# Patient Record
Sex: FEMALE | Race: OTHER | Employment: FULL TIME | URBAN - METROPOLITAN AREA
[De-identification: names, ages, dates, MRNs, and addresses within clinical notes are randomized per-mention and may not be internally consistent; named-entity substitution may affect disease eponyms.]

---

## 2020-12-29 ENCOUNTER — OFFICE VISIT (OUTPATIENT)
Dept: ENDOCRINOLOGY | Age: 25
End: 2020-12-29
Payer: COMMERCIAL

## 2020-12-29 VITALS
RESPIRATION RATE: 14 BRPM | HEIGHT: 63 IN | SYSTOLIC BLOOD PRESSURE: 120 MMHG | DIASTOLIC BLOOD PRESSURE: 82 MMHG | TEMPERATURE: 97.2 F | HEART RATE: 87 BPM | WEIGHT: 170 LBS | BODY MASS INDEX: 30.12 KG/M2

## 2020-12-29 PROCEDURE — 99204 OFFICE O/P NEW MOD 45 MIN: CPT | Performed by: INTERNAL MEDICINE

## 2020-12-29 RX ORDER — LEVOTHYROXINE SODIUM 0.12 MG/1
125 TABLET ORAL DAILY
COMMUNITY
Start: 2020-05-14 | End: 2021-01-07 | Stop reason: SDUPTHER

## 2020-12-29 RX ORDER — NORETHINDRONE ACETATE AND ETHINYL ESTRADIOL 1MG-20(21)
1 KIT ORAL
COMMUNITY
Start: 2020-01-22 | End: 2021-03-23 | Stop reason: SDUPTHER

## 2020-12-29 NOTE — PROGRESS NOTES
SUBJECTIVE:  Pavan Woo is a 30 y.o. female who is being evaluated for hypothyroidism. 1. Acquired hypothyroidism    This started in 2019. Patient was diagnosed with hypothyroidism. The problem has been gradually worsening. Previous thyroid studies include: TSH and free thyroxine. Patient started medication in 2019. Currently patient is on: levothyroxine. Misses 50% doses a month. Current complaints: fatigue, irritability, mood swings, hair loss  Has fatigue, moderate in intensity  Fatigue worse in the afternoon  Has sleepiness    2. Hashimoto's thyroiditis  Complains of fatigue, weight gain  Positive TPO antibodies    3. Oligomenorrhea, unspecified type  Irregular periods without BCP and with BCP, usually skips 1 month  Started periods at 13 yo. Periods used to be heavy, not as heavy at this time. 4. Class 1 obesity with body mass index (BMI) of 30.0 to 30.9 in adult, unspecified obesity type, unspecified whether serious comorbidity present  Gained weight 20 pounds over 2 years despite diet and exercise  Has   Celiac panel negative  On [facility] weight management program, lost some weight    5. Goiter  History of obstructive symptoms: difficulty swallowing No, changes in voice/hoarseness No.  History of radiation to patient's neck: No  Resent iodine exposure: No  Family history includes hypothyroidism  Family history of thyroid cancer: No    6. Low libido  Patient complains of low libido and is concerned that its abnormal at her age    9. Anemia, unspecified type  Has fatigue    8. B12 deficiency  No numbness    History reviewed. No pertinent past medical history.   Patient Active Problem List    Diagnosis Date Noted    Hashimoto's thyroiditis 12/29/2020    Acquired hypothyroidism 12/29/2020    Anemia 12/29/2020    B12 deficiency 12/29/2020    Goiter 12/29/2020    Class 1 obesity with body mass index (BMI) of 30.0 to 30.9 in adult 12/29/2020    Oligomenorrhea 12/29/2020  Low libido 12/29/2020     History reviewed. No pertinent surgical history. Family History   Problem Relation Age of Onset    Hypothyroidism Mother     Bipolar Disorder Mother      Social History     Socioeconomic History    Marital status: Single     Spouse name: None    Number of children: None    Years of education: None    Highest education level: None   Occupational History    None   Social Needs    Financial resource strain: None    Food insecurity     Worry: None     Inability: None    Transportation needs     Medical: None     Non-medical: None   Tobacco Use    Smoking status: Never Smoker    Smokeless tobacco: Never Used   Substance and Sexual Activity    Alcohol use: Yes     Comment: 2-3 per week    Drug use: Never    Sexual activity: None   Lifestyle    Physical activity     Days per week: None     Minutes per session: None    Stress: None   Relationships    Social connections     Talks on phone: None     Gets together: None     Attends [employer] service: None     Active member of club or organization: None     Attends meetings of clubs or organizations: None     Relationship status: None    Intimate partner violence     Fear of current or ex partner: None     Emotionally abused: None     Physically abused: None     Forced sexual activity: None   Other Topics Concern    None   Social History Narrative    None     Current Outpatient Medications   Medication Sig Dispense Refill    levothyroxine (SYNTHROID) 125 MCG tablet Take 125 mcg by mouth daily      norethindrone-ethinyl estradiol (JUNEL FE 1/20) 1-20 MG-MCG per tablet Take 1 tablet by mouth       No current facility-administered medications for this visit.       No Known Allergies  Family Status   Relation Name Status    Mother  (Not Specified)       Review of Systems:  Constitutional: has fatigue, no fever, has recent weight gain, no recent weight loss, no changes in appetite Eyes: no eye pain, no change in vision, no eye redness, no eye irritation, no double vision  Ears, nose, throat: no nasal congestion, no sore throat, no earache, no decrease in hearing, no hoarseness, no dry mouth, no sinus problems, no difficulty swallowing, no neck lumps, no dental problems, no mouth sores, no ringing in ears  Pulmonary: no shortness of breath, no wheezing, no dyspnea on exertion, no cough  Cardiovascular: no chest pain, no lower extremity edema, no orthopnea, no intermittent leg claudication, no palpitations  Gastrointestinal: no abdominal pain, no nausea, no vomiting, no diarrhea, no constipation, no dysphagia, no heartburn, no bloating  Genitourinary: no dysuria, no urinary incontinence, no urinary hesitancy, no urinary frequency, no feelings of urinary urgency, no nocturia  Musculoskeletal: no joint swelling, no joint stiffness, no joint pain, no muscle cramps, no muscle pain, no bone pain  Integument/Breast: has hair loss, no skin rashes, no skin lesions, no itching, no dry skin  Neurological: no numbness, no tingling, no weakness, no confusion, has headaches, no dizziness, no fainting, no tremors, no decrease in memory, no balance problems  Psychiatric: has anxiety, has depression, no insomnia  Hematologic/Lymphatic: no tendency for easy bleeding, no swollen lymph nodes, no tendency for easy bruising  Immunology: no seasonal allergies, no frequent infections, no frequent illnesses  Endocrine: has temperature intolerance    /82   Pulse 87   Temp 97.2 °F (36.2 °C)   Resp 14   Ht 5' 3\" (1.6 m)   Wt 170 lb (77.1 kg)   LMP 12/27/2020   BMI 30.11 kg/m²    Wt Readings from Last 3 Encounters:   12/29/20 170 lb (77.1 kg)     Body mass index is 30.11 kg/m².     OBJECTIVE:  Constitutional: no acute distress, well appearing and well nourished  Psychiatric: oriented to person, place and time, judgement and insight and normal, recent and remote memory and intact and mood and affect are normal Skin: skin and subcutaneous tissue is normal without mass, normal turgor  Head and Face: examination of head and face revealed no abnormalities  Eyes: no lid or conjunctival swelling, erythema or discharge, pupils are normal, equal, round, reactive to light  Ears/Nose: external inspection of ears and nose revealed no abnormalities, hearing is grossly normal  Oropharynx/Mouth/Face: lips, tongue and gums are normal with no lesions, the voice quality was normal  Neck: neck is supple and symmetric, with midline trachea and no masses, thyroid is enlarged  Lymphatics: normal cervical lymph nodes, normal supraclavicular nodes  Pulmonary: no increased work of breathing or signs of respiratory distress, lungs are clear to auscultation  Cardiovascular: normal heart rate and rhythm, normal S1 and S2, no murmurs and pedal pulses and 2+ bilaterally, No edema  Abdomen: abdomen is soft, non-tender with no masses  Musculoskeletal: normal gait and station and exam of the digits and nails are normal  Neurological: normal coordination and normal general cortical function      Lab Review:    Lab Results   Component Value Date    WBC 4.7 12/29/2020    HGB 12.9 12/29/2020    HCT 39.5 12/29/2020    MCV 82.9 12/29/2020     12/29/2020     Lab Results   Component Value Date     12/29/2020    K 4.1 12/29/2020     12/29/2020    CO2 26 12/29/2020    BUN 13 12/29/2020    CREATININE 0.7 12/29/2020    GLUCOSE 87 12/29/2020    CALCIUM 9.2 12/29/2020    PROT 7.9 12/29/2020    LABALBU 4.5 12/29/2020    BILITOT 0.3 12/29/2020    ALKPHOS 29 12/29/2020    AST 28 12/29/2020    ALT 18 12/29/2020    LABGLOM >60 12/29/2020    GFRAA >60 12/29/2020    AGRATIO 1.3 12/29/2020    GLOB 3.4 12/29/2020     Lab Results   Component Value Date    TSH 34.14 12/29/2020    FT3 2.5 12/29/2020     No results found for: LABA1C  No results found for: EAG  No results found for: CHOL  No results found for: TRIG  No results found for: HDL Reviewed and/or ordered radiology tests Yes   Reviewed and/or ordered other diagnostic tests No  Discussed test results with performing physician No  Independently reviewed image, tracing, or specimen No  Made a decision to obtain old records No  Reviewed and summarized old records No   TSH 26.7-8.2-2.8  Hemoglobin A1c 5.4  B12 96  TPO antibody 734.9  Thyroglobulin antibody 1.9  Celiac panel negative  Hb 11.2  Obtained history from other than patient No    Berenice Aga was counseled regarding symptoms of thyroid, oligomenorrhea, Hashimoto's thyroiditis, low libido diagnosis, course and complications of disease if inadequately treated, side effects of medications, diagnosis, treatment options, and prognosis, risks, benefits, complications, and alternatives of treatment, labs, imaging and other studies and treatment targets and goals, different thyroid medications, side effects and benefits. She understands instructions and counseling. Total visit time 45 minutes, more than 50% was counseling time  See assessment, plan and counseling note for details    Return in about 2 weeks (around 1/12/2021) for thyroid problems.     Electronically signed by Ann Pratt MD on 12/31/2020 at 8:40 PM

## 2021-01-07 ENCOUNTER — OFFICE VISIT (OUTPATIENT)
Dept: ENDOCRINOLOGY | Age: 26
End: 2021-01-07
Payer: COMMERCIAL

## 2021-01-07 VITALS
HEIGHT: 63 IN | BODY MASS INDEX: 29.95 KG/M2 | RESPIRATION RATE: 14 BRPM | OXYGEN SATURATION: 98 % | SYSTOLIC BLOOD PRESSURE: 110 MMHG | WEIGHT: 169 LBS | HEART RATE: 62 BPM | DIASTOLIC BLOOD PRESSURE: 80 MMHG

## 2021-01-07 DIAGNOSIS — E04.9 GOITER: ICD-10-CM

## 2021-01-07 DIAGNOSIS — E03.9 ACQUIRED HYPOTHYROIDISM: Primary | ICD-10-CM

## 2021-01-07 DIAGNOSIS — E53.8 B12 DEFICIENCY: ICD-10-CM

## 2021-01-07 DIAGNOSIS — E06.3 HASHIMOTO'S THYROIDITIS: ICD-10-CM

## 2021-01-07 DIAGNOSIS — E55.9 VITAMIN D DEFICIENCY: ICD-10-CM

## 2021-01-07 DIAGNOSIS — E66.9 CLASS 1 OBESITY WITH BODY MASS INDEX (BMI) OF 30.0 TO 30.9 IN ADULT, UNSPECIFIED OBESITY TYPE, UNSPECIFIED WHETHER SERIOUS COMORBIDITY PRESENT: ICD-10-CM

## 2021-01-07 DIAGNOSIS — R68.82 LOW LIBIDO: ICD-10-CM

## 2021-01-07 DIAGNOSIS — N91.5 OLIGOMENORRHEA, UNSPECIFIED TYPE: ICD-10-CM

## 2021-01-07 DIAGNOSIS — D64.9 ANEMIA, UNSPECIFIED TYPE: ICD-10-CM

## 2021-01-07 PROCEDURE — G8427 DOCREV CUR MEDS BY ELIG CLIN: HCPCS | Performed by: INTERNAL MEDICINE

## 2021-01-07 PROCEDURE — G8417 CALC BMI ABV UP PARAM F/U: HCPCS | Performed by: INTERNAL MEDICINE

## 2021-01-07 PROCEDURE — G8484 FLU IMMUNIZE NO ADMIN: HCPCS | Performed by: INTERNAL MEDICINE

## 2021-01-07 PROCEDURE — 1036F TOBACCO NON-USER: CPT | Performed by: INTERNAL MEDICINE

## 2021-01-07 PROCEDURE — 99213 OFFICE O/P EST LOW 20 MIN: CPT | Performed by: INTERNAL MEDICINE

## 2021-01-07 RX ORDER — LEVOTHYROXINE SODIUM 0.12 MG/1
125 TABLET ORAL DAILY
Qty: 30 TABLET | Refills: 2 | Status: SHIPPED | OUTPATIENT
Start: 2021-01-07 | End: 2021-02-23

## 2021-01-07 RX ORDER — MELATONIN
2000 DAILY
Qty: 90 TABLET | Refills: 1
Start: 2021-01-07

## 2021-01-07 NOTE — PROGRESS NOTES
SUBJECTIVE:  Sandra Scott is a 30 y.o. female who is being evaluated for hypothyroidism. 1. Acquired hypothyroidism    This started in 2019. Patient was diagnosed with hypothyroidism. The problem has been gradually worsening. Previous thyroid studies include: TSH and free thyroxine. Patient started medication in 2019. Currently patient is on: levothyroxine. Misses 50% doses a month. Current complaints: fatigue, irritability, mood swings, hair loss  Has fatigue, moderate in intensity  Fatigue worse in the afternoon  Has sleepiness    2. Hashimoto's thyroiditis  Complains of fatigue, weight gain  Positive TPO antibodies    3. Oligomenorrhea, unspecified type  Irregular periods without BCP and with BCP, usually skips 1 month  Started periods at 13 yo. Periods used to be heavy, not as heavy at this time. 4. Obesity  Gained weight 20 pounds over 2 years despite diet and exercise  Has   Celiac panel negative  On [employer] [facility] program, lost some weight    5. Goiter  History of obstructive symptoms: difficulty swallowing No, changes in voice/hoarseness No.  History of radiation to patient's neck: No  Resent iodine exposure: No  Family history includes hypothyroidism  Family history of thyroid cancer: No    6. Low libido  Patient complains of low libido and is concerned that its abnormal at her age    9. Anemia, unspecified type  Has fatigue    8. B12 deficiency  No numbness    9. Vitamin D deficiency   Has fatigue    History reviewed. No pertinent past medical history. Patient Active Problem List    Diagnosis Date Noted    Hashimoto's thyroiditis 12/29/2020    Acquired hypothyroidism 12/29/2020    Anemia 12/29/2020    B12 deficiency 12/29/2020    Goiter 12/29/2020    Class 1 obesity with body mass index (BMI) of 30.0 to 30.9 in adult 12/29/2020    Oligomenorrhea 12/29/2020    Low libido 12/29/2020     History reviewed. No pertinent surgical history.   Family History Problem Relation Age of Onset    Hypothyroidism Mother     Bipolar Disorder Mother      Social History     Socioeconomic History    Marital status: Single     Spouse name: None    Number of children: None    Years of education: None    Highest education level: None   Occupational History    None   Social Needs    Financial resource strain: None    Food insecurity     Worry: None     Inability: None    Transportation needs     Medical: None     Non-medical: None   Tobacco Use    Smoking status: Never Smoker    Smokeless tobacco: Never Used   Substance and Sexual Activity    Alcohol use: Yes     Comment: 2-3 per week    Drug use: Never    Sexual activity: None   Lifestyle    Physical activity     Days per week: None     Minutes per session: None    Stress: None   Relationships    Social connections     Talks on phone: None     Gets together: None     Attends [employer] service: None     Active member of club or organization: None     Attends meetings of clubs or organizations: None     Relationship status: None    Intimate partner violence     Fear of current or ex partner: None     Emotionally abused: None     Physically abused: None     Forced sexual activity: None   Other Topics Concern    None   Social History Narrative    None     Current Outpatient Medications   Medication Sig Dispense Refill    levothyroxine (SYNTHROID) 125 MCG tablet Take 1 tablet by mouth daily 30 tablet 2    norethindrone-ethinyl estradiol (JUNEL FE 1/20) 1-20 MG-MCG per tablet Take 1 tablet by mouth       No current facility-administered medications for this visit.       No Known Allergies  Family Status   Relation Name Status    Mother  (Not Specified)       Review of Systems:  Constitutional: has fatigue, no fever, has recent weight gain, no recent weight loss, no changes in appetite  Eyes: no eye pain, no change in vision, no eye redness, no eye irritation, no double vision Ears, nose, throat: no nasal congestion, no sore throat, no earache, no decrease in hearing, no hoarseness, no dry mouth, no sinus problems, no difficulty swallowing, no neck lumps, no dental problems, no mouth sores, no ringing in ears  Pulmonary: no shortness of breath, no wheezing, no dyspnea on exertion, no cough  Cardiovascular: no chest pain, no lower extremity edema, no orthopnea, no intermittent leg claudication, no palpitations  Gastrointestinal: no abdominal pain, no nausea, no vomiting, no diarrhea, no constipation, no dysphagia, no heartburn, no bloating  Genitourinary: no dysuria, no urinary incontinence, no urinary hesitancy, no urinary frequency, no feelings of urinary urgency, no nocturia  Musculoskeletal: no joint swelling, no joint stiffness, no joint pain, no muscle cramps, no muscle pain, no bone pain  Integument/Breast: has hair loss, no skin rashes, no skin lesions, no itching, no dry skin  Neurological: no numbness, no tingling, no weakness, no confusion, has headaches, no dizziness, no fainting, no tremors, no decrease in memory, no balance problems  Psychiatric: has anxiety, has depression, no insomnia  Hematologic/Lymphatic: no tendency for easy bleeding, no swollen lymph nodes, no tendency for easy bruising  Immunology: no seasonal allergies, no frequent infections, no frequent illnesses  Endocrine: has temperature intolerance    /80   Pulse 62   Resp 14   Ht 5' 3\" (1.6 m)   Wt 169 lb (76.7 kg)   LMP 12/27/2020   SpO2 98%   BMI 29.94 kg/m²    Wt Readings from Last 3 Encounters:   01/07/21 169 lb (76.7 kg)   12/29/20 170 lb (77.1 kg)     Body mass index is 29.94 kg/m².     OBJECTIVE:  Constitutional: no acute distress, well appearing and well nourished  Psychiatric: oriented to person, place and time, judgement and insight and normal, recent and remote memory and intact and mood and affect are normal  Skin: skin and subcutaneous tissue is normal without mass, normal turgor Head and Face: examination of head and face revealed no abnormalities  Eyes: no lid or conjunctival swelling, erythema or discharge, pupils are normal, equal, round, reactive to light  Ears/Nose: external inspection of ears and nose revealed no abnormalities, hearing is grossly normal  Oropharynx/Mouth/Face: lips, tongue and gums are normal with no lesions, the voice quality was normal  Neck: neck is supple and symmetric, with midline trachea and no masses, thyroid is enlarged  Lymphatics: normal cervical lymph nodes, normal supraclavicular nodes  Pulmonary: no increased work of breathing or signs of respiratory distress, lungs are clear to auscultation  Cardiovascular: normal heart rate and rhythm, normal S1 and S2, no murmurs and pedal pulses and 2+ bilaterally, No edema  Abdomen: abdomen is soft, non-tender with no masses  Musculoskeletal: normal gait and station and exam of the digits and nails are normal  Neurological: normal coordination and normal general cortical function      Lab Review:    Lab Results   Component Value Date    WBC 4.7 12/29/2020    HGB 12.9 12/29/2020    HCT 39.5 12/29/2020    MCV 82.9 12/29/2020     12/29/2020     Lab Results   Component Value Date     12/29/2020    K 4.1 12/29/2020     12/29/2020    CO2 26 12/29/2020    BUN 13 12/29/2020    CREATININE 0.7 12/29/2020    GLUCOSE 87 12/29/2020    CALCIUM 9.2 12/29/2020    PROT 7.9 12/29/2020    LABALBU 4.5 12/29/2020    BILITOT 0.3 12/29/2020    ALKPHOS 29 12/29/2020    AST 28 12/29/2020    ALT 18 12/29/2020    LABGLOM >60 12/29/2020    GFRAA >60 12/29/2020    AGRATIO 1.3 12/29/2020    GLOB 3.4 12/29/2020     Lab Results   Component Value Date    TSH 34.14 12/29/2020    FT3 2.5 12/29/2020     No results found for: LABA1C  No results found for: EAG  No results found for: CHOL  No results found for: TRIG  No results found for: HDL  No results found for: LDLCHOLESTEROL, LDLCALC  No results found for: LABVLDL, VLDL No results found for: JOSIAH  No results found for: Dominick Levi  Lab Results   Component Value Date    VITD25 24.4 12/29/2020        ASSESSMENT/PLAN:    1. Acquired hypothyroidism  Patient was taking only half tablet of levothyroxine for a while, and recently a week ago resumed full tablet. TSH uncontrolled, 34.14  Continue current levothyroxine 0.25 mg daily  Counseled parents about different thyroid medications, their side effects and benefits. Discussed several treatment options when thyroid is controlled  - T3, Free; Future  - T4, Free; Future  - TSH without Reflex; Future  - T3, Reverse; Future    2. Hashimoto's thyroiditis  - Thyroid Peroxidase Antibody; Future  - Anti-Thyroglobulin Antibody; Future    3. Anemia, unspecified type  Low Ferritin 9.8  - Vitamin B12 & Folate; Future  - CBC; Future  - Iron and TIBC; Future  - Ferritin; Future  - Vitamin D 25 Hydroxy; Future    4. B12 deficiency  B12 173  Will see [facility]  - Vitamin B12 & Folate; Future    5. Goiter  - Thyroid Peroxidase Antibody; Future  - Anti-Thyroglobulin Antibody; Future  - US HEAD NECK SOFT TISSUE THYROID; Future    6. Obesity  - Comprehensive Metabolic Panel; Future  Patient is interested in weight loss medications, but advised to control thyroid first  Diet and exercise    7. Oligomenorrhea, unspecified type  Follow    8. Low libido  - DHEA-Sulfate; Future  - Testosterone, free, total; Future    9.  Vitamin D deficiency  25OHvitamin D 24.4  Has fatigue  Start vitamin D 2000 international units daily  New problem    Reviewed and/or ordered clinical lab results Yes  Reviewed and/or ordered radiology tests Yes   Reviewed and/or ordered other diagnostic tests No  Discussed test results with performing physician No  Independently reviewed image, tracing, or specimen No  Made a decision to obtain old records No  Reviewed and summarized old records No   TSH 26.7-8.2-2.8  Hemoglobin A1c 5.4  B12 96  TPO antibody 734.9 Thyroglobulin antibody 1.9  Celiac panel negative  Hb 11.2  Obtained history from other than patient Danielle Mckeon was counseled regarding symptoms of thyroid, oligomenorrhea, Hashimoto's thyroiditis, low libido diagnosis, course and complications of disease if inadequately treated, side effects of medications, diagnosis, treatment options, and prognosis, risks, benefits, complications, and alternatives of treatment, labs, imaging and other studies and treatment targets and goals, different thyroid medications, side effects and benefits. She understands instructions and counseling. Total time spent for this encounter 30 minutes    Return in about 1 month (around 2/7/2021) for thyroid problems.     Electronically signed by Suzan Cleveland MD on 1/7/2021 at 12:27 PM

## 2021-01-08 ENCOUNTER — HOSPITAL ENCOUNTER (OUTPATIENT)
Dept: ULTRASOUND IMAGING | Age: 26
Discharge: HOME | End: 2021-01-08
Payer: COMMERCIAL

## 2021-01-08 DIAGNOSIS — E04.9 GOITER: ICD-10-CM

## 2021-01-08 PROCEDURE — 76536 US EXAM OF HEAD AND NECK: CPT

## 2021-01-12 ENCOUNTER — OFFICE VISIT (OUTPATIENT)
Dept: PRIMARY CARE CLINIC | Age: 26
End: 2021-01-12
Payer: COMMERCIAL

## 2021-01-12 VITALS
BODY MASS INDEX: 30.23 KG/M2 | OXYGEN SATURATION: 100 % | TEMPERATURE: 96.3 F | SYSTOLIC BLOOD PRESSURE: 117 MMHG | HEIGHT: 63 IN | RESPIRATION RATE: 16 BRPM | HEART RATE: 59 BPM | DIASTOLIC BLOOD PRESSURE: 77 MMHG | WEIGHT: 170.6 LBS

## 2021-01-12 DIAGNOSIS — Z11.3 SCREEN FOR STD (SEXUALLY TRANSMITTED DISEASE): ICD-10-CM

## 2021-01-12 DIAGNOSIS — R45.4 IRRITABILITY: ICD-10-CM

## 2021-01-12 DIAGNOSIS — E06.3 HASHIMOTO'S DISEASE: ICD-10-CM

## 2021-01-12 DIAGNOSIS — E53.8 LOW SERUM VITAMIN B12: ICD-10-CM

## 2021-01-12 DIAGNOSIS — D50.9 IRON DEFICIENCY ANEMIA, UNSPECIFIED IRON DEFICIENCY ANEMIA TYPE: ICD-10-CM

## 2021-01-12 DIAGNOSIS — Z11.59 NEED FOR HEPATITIS C SCREENING TEST: ICD-10-CM

## 2021-01-12 DIAGNOSIS — E55.9 VITAMIN D DEFICIENCY: ICD-10-CM

## 2021-01-12 DIAGNOSIS — Z01.419 WELL WOMAN EXAM WITH ROUTINE GYNECOLOGICAL EXAM: Primary | ICD-10-CM

## 2021-01-12 PROCEDURE — 99385 PREV VISIT NEW AGE 18-39: CPT | Performed by: STUDENT IN AN ORGANIZED HEALTH CARE EDUCATION/TRAINING PROGRAM

## 2021-01-12 PROCEDURE — G8484 FLU IMMUNIZE NO ADMIN: HCPCS | Performed by: STUDENT IN AN ORGANIZED HEALTH CARE EDUCATION/TRAINING PROGRAM

## 2021-01-12 RX ORDER — FERROUS SULFATE 325(65) MG
325 TABLET ORAL
Qty: 90 TABLET | Refills: 0 | Status: SHIPPED | OUTPATIENT
Start: 2021-01-12 | End: 2022-07-18

## 2021-01-12 RX ORDER — POLYETHYLENE GLYCOL 3350 17 G/17G
17 POWDER, FOR SOLUTION ORAL DAILY
Qty: 1530 G | Refills: 1 | Status: SHIPPED | OUTPATIENT
Start: 2021-01-12 | End: 2021-02-11

## 2021-01-12 SDOH — ECONOMIC STABILITY: TRANSPORTATION INSECURITY
IN THE PAST 12 MONTHS, HAS THE LACK OF TRANSPORTATION KEPT YOU FROM MEDICAL APPOINTMENTS OR FROM GETTING MEDICATIONS?: NO

## 2021-01-12 SDOH — ECONOMIC STABILITY: FOOD INSECURITY: WITHIN THE PAST 12 MONTHS, YOU WORRIED THAT YOUR FOOD WOULD RUN OUT BEFORE YOU GOT MONEY TO BUY MORE.: NEVER TRUE

## 2021-01-12 SDOH — ECONOMIC STABILITY: TRANSPORTATION INSECURITY
IN THE PAST 12 MONTHS, HAS LACK OF TRANSPORTATION KEPT YOU FROM MEETINGS, WORK, OR FROM GETTING THINGS NEEDED FOR DAILY LIVING?: NO

## 2021-01-12 ASSESSMENT — ENCOUNTER SYMPTOMS
COUGH: 0
DIARRHEA: 0
CONSTIPATION: 0
SHORTNESS OF BREATH: 0
VOMITING: 0
SORE THROAT: 0
NAUSEA: 0
RHINORRHEA: 0

## 2021-01-12 ASSESSMENT — PATIENT HEALTH QUESTIONNAIRE - PHQ9
SUM OF ALL RESPONSES TO PHQ QUESTIONS 1-9: 2
SUM OF ALL RESPONSES TO PHQ QUESTIONS 1-9: 2
SUM OF ALL RESPONSES TO PHQ9 QUESTIONS 1 & 2: 2
1. LITTLE INTEREST OR PLEASURE IN DOING THINGS: 1
SUM OF ALL RESPONSES TO PHQ QUESTIONS 1-9: 2
2. FEELING DOWN, DEPRESSED OR HOPELESS: 1

## 2021-01-12 NOTE — PROGRESS NOTES
Behavioral Health Consultation  Walter Barbosa Psy.D. Psychologist  1/13/2021  Start time 2:35pm Stop time 3:05pm    Time spent with Patient: 30 minutes  This is patient's first [facility] appointment. Reason for Consult:  mood  Referring Provider: PCP    Feedback for PCP: No action needed. Writer will continue to follow pt. At initial visit, pt/guardian provided informed consent for the behavioral health program. Discussed with patient model of service to include the limits of confidentiality (i.e. abuse reporting, suicide intervention, etc.) and short-term intervention focused approach. Pt/guardian indicated understanding    TELEHEALTH VISIT -- Audio and video (During XGEBU-02 public health emergency)  }  Pursuant to the emergency declaration under the [facility]5 waiver authority and the [employer] General Act, this visit was conducted, with patient/guardian's consent, to reduce the patient's risk of exposure to COVID-19 and provide continuity of care for an established patient. Services were provided through a telehealth discussion to substitute for in-person clinic visit. Pt/guardian gave verbal informed consent to participate in telehealth services. Consent:  She and/or health care decision maker is aware that that she may receive a bill for this service, depending on her insurance coverage, and has provided verbal consent to proceed:  Yes Conducted a risk-benefit analysis and determined that the patient's presenting problems are consistent with the use of telepsychology. Determined that the patient has sufficient knowledge and skills in the use of technology enabling them to adequately benefit from telepsychology. It was determined that this patient was able to be properly treated without an in-person session. Patient/guardian verified that they were currently located at the [facility] address that was provided during registration. Verified the following information:  Patient's identification: Yes  Patient location: [address] [place] [id number]  Patient's call back number: [phone]   Patient's emergency contact's name and number, as well as permission to contact them if needed: Extended Emergency Contact Information  Primary Emergency Contact: [facility]  Address: [address] [place] [address] Phone: [phone]  Mobile Phone: [phone]  Relation: Spouse  Preferred language: English   needed? No     Provider location: [address] this is an episode with a patient who has not had a related appointment within my department in the past 7 days or scheduled within the next 24 hours. S:    Patient reports that she moved to [place] from [facility] and is now working from home. Patient reports feeling isolated. States that she has been feeling down more lately and crying more. Feels out of control of her emotions. Patient reports that she has attempted many self-care strategies. Has downloaded an mina that she is using to focus on her mental health. Patient is debating whether she wants to engage in mental health treatment.   Has found therapy to be helpful in the past. ? Depression sx: anhedonia/diminished interest in activities, depressed mood, psychomotor retardation, fatigue, feelings of worthlessness and difficulties with concentration, symptoms have been present most recently for the past 6 months, but worse over the past 2 months  ? SI/HI: Denied  ? Coping skills: self-care mina, exercise, talking with boyfriend, dog    History:    Psychiatric history:  ? Current psychotropic medications:  Denied  ? Past mental health treatment: saw therapists in the past    Social History:   ? Social: dogs, fiance (was supposed to get  in 4/2021), friends and family in [facility], family relationships are strained   ? Family psychiatric history: mother (bipolar disorder)  ? 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 Race/ethnicity: \"\"  ? Sabianist/spiritual beliefs: Denied    Health habits:  ? Sleep: average of 8 hours of sleep per night but not restful  ? Caffeine: 1 cup coffee per day  ? Exercise: , exercising   ? Medication adherence: Yes  Social History     Tobacco Use    Smoking status: Never Smoker    Smokeless tobacco: Never Used   Substance Use Topics    Alcohol use: Yes     Comment: 2-3 per week   ? Social History     Substance and Sexual Activity   Drug Use Never   ?     Current Outpatient Medications   Medication Sig Dispense Refill    ferrous sulfate (IRON 325) 325 (65 Fe) MG tablet Take 1 tablet by mouth daily (with breakfast) 90 tablet 0    cyanocobalamin ([employer] VITAMIN B12) 1000 MCG tablet Take 1 tablet by mouth daily 30 tablet 1    polyethylene glycol (GLYCOLAX) 17 GM/SCOOP powder Take 17 g by mouth daily 1530 g 1    levothyroxine (SYNTHROID) 125 MCG tablet Take 1 tablet by mouth daily 30 tablet 2    vitamin D3 (CHOLECALCIFEROL) 25 MCG (1000 UT) TABS tablet Take 2 tablets by mouth daily 90 tablet 1    norethindrone-ethinyl estradiol (JUNEL FE 1/20) 1-20 MG-MCG per tablet Take 1 tablet by mouth No current facility-administered medications for this visit. ? O:  MSE:    Appearance: good hygiene   Attitude: cooperative and friendly  Consciousness: alert  Orientation: oriented to person, place, time, general circumstance  Memory: recent and remote memory intact  Attention/Concentration: intact during session  Psychomotor Activity:normal  Eye Contact: normal  Speech: normal rate and volume, well-articulated  Mood: sad  Affect: dysphoric  Perception: within normal limits  Thought Content: within normal limits  Thought Process: logical, coherent and goal-directed  Insight: good  Judgment: intact  Ability to understand instructions: Yes  Ability to respond meaningfully: Yes  Morbid Ideation: no   Suicide Assessment: no suicidal ideation, plan, or intent  Homicidal Ideation: no    A:  Administered PHQ-9 (see below). PHQ Scores 1/12/2021   PHQ2 Score 2   PHQ9 Score 2     Interpretation of Total Score Depression Severity: 1-4 = Minimal depression, 5-9 = Mild depression, 10-14 = Moderate depression, 15-19 = Moderately severe depression, 20-27 = Severe depression    Assessment/Progress in treatment/Treatment plan:  Patient appears to be experiencing low mood, exacerbated by moving to [place], being isolated working from home, difficulties with weight loss, Hashimoto's disease and negative thoughts about herself. Current coping skills include exercise, talking with boyfriend, and dog and factors maintaining symptoms include maladaptive thoughts. May benefit from brief intervention from writer for adaptive coping skill development. Will refer to specialty mental health in the future if indicated. Diagnosis:    1.  Adjustment disorder with depressed mood       Patient Active Problem List   Diagnosis    Hashimoto's thyroiditis    Acquired hypothyroidism    Anemia    B12 deficiency    Goiter    Class 1 obesity with body mass index (BMI) of 30.0 to 30.9 in adult    Oligomenorrhea    Low libido  Vitamin D deficiency        Plan:  Set the following goals: 1) review ACT    Follow-up:   Return if symptoms worsen or fail to improve.      Pt interventions:  Established rapport, [place]-setting to identify pt's primary goals for [facility] visit / overall health, Supportive techniques, Provided Psychoeducation re: depression and depression treatment, Engaged in treatment planning and Discussed potential treatment options, including brief psychotherapy vs. referral for specialty mental health

## 2021-01-12 NOTE — PROGRESS NOTES
Prior to Visit Medications    Medication Sig Taking? Authorizing Provider   ferrous sulfate (IRON 325) 325 (65 Fe) MG tablet Take 1 tablet by mouth daily (with breakfast) Yes Viral Baeza MD   cyanocobalamin (CVS VITAMIN B12) 1000 MCG tablet Take 1 tablet by mouth daily Yes Viral Baeza MD   polyethylene glycol (GLYCOLAX) 17 GM/SCOOP powder Take 17 g by mouth daily Yes Viral Baeza MD   levothyroxine (SYNTHROID) 125 MCG tablet Take 1 tablet by mouth daily Yes Sunita Mcduffie MD   vitamin D3 (CHOLECALCIFEROL) 25 MCG (1000 UT) TABS tablet Take 2 tablets by mouth daily Yes Sunita Mcduffie MD   norethindrone-ethinyl estradiol (JUNEL FE 1/20) 1-20 MG-MCG per tablet Take 1 tablet by mouth Yes Historical Provider, MD        No Known Allergies    No past medical history on file. No past surgical history on file.     Social History     Socioeconomic History    Marital status: Single     Spouse name: Not on file    Number of children: Not on file    Years of education: Not on file    Highest education level: Not on file   Occupational History    Not on file   Social Needs    Financial resource strain: Not hard at all    Food insecurity     Worry: Never true     Inability: Never true   [employer] needs     Medical: No     Non-medical: No   Tobacco Use    Smoking status: Never Smoker    Smokeless tobacco: Never Used   Substance and Sexual Activity    Alcohol use: Yes     Comment: 2-3 per week    Drug use: Never    Sexual activity: Not on file   Lifestyle    Physical activity     Days per week: Not on file     Minutes per session: Not on file    Stress: Not on file   Relationships    Social connections     Talks on phone: Not on file     Gets together: Not on file     Attends Religion service: Not on file     Active member of club or organization: Not on file     Attends meetings of clubs or organizations: Not on file     Relationship status: Not on file    Intimate partner violence Fear of current or ex partner: Not on file     Emotionally abused: Not on file     Physically abused: Not on file     Forced sexual activity: Not on file   Other Topics Concern    Not on file   Social History Narrative    Not on file        Family History   Problem Relation Age of Onset    Hypothyroidism Mother     Bipolar Disorder Mother        ADVANCE DIRECTIVE: N, <no information>    Vitals:    01/12/21 1042   BP: 117/77   Site: Right Upper Arm   Position: Sitting   Cuff Size: Medium Adult   Pulse: 59   Resp: 16   Temp: 96.3 °F (35.7 °C)   TempSrc: Infrared   SpO2: 100%   Weight: 170 lb 9.6 oz (77.4 kg)   Height: 5' 3\" (1.6 m)     Estimated body mass index is 30.22 kg/m² as calculated from the following:    Height as of this encounter: 5' 3\" (1.6 m). Weight as of this encounter: 170 lb 9.6 oz (77.4 kg). Physical Exam  Vitals signs reviewed. Exam conducted with a chaperone present. Constitutional:       General: She is not in acute distress. Appearance: Normal appearance. She is not ill-appearing. HENT:      Head: Normocephalic and atraumatic. Right Ear: Tympanic membrane, ear canal and external ear normal.      Left Ear: Tympanic membrane, ear canal and external ear normal.      Nose: Nose normal. No rhinorrhea. Mouth/Throat:      Mouth: Mucous membranes are moist.      Pharynx: Oropharynx is clear. No oropharyngeal exudate. Eyes:      General: No scleral icterus. Right eye: No discharge. Left eye: No discharge. Extraocular Movements: Extraocular movements intact. Conjunctiva/sclera: Conjunctivae normal.   Neck:      Musculoskeletal: Neck supple. No muscular tenderness. Cardiovascular:      Rate and Rhythm: Normal rate and regular rhythm. Pulses: Normal pulses. Heart sounds: Normal heart sounds. No murmur. No gallop. Pulmonary:      Effort: Pulmonary effort is normal.      Breath sounds: Normal breath sounds. No wheezing, rhonchi or rales. Abdominal:      General: Abdomen is flat. Bowel sounds are normal. There is no distension. Palpations: Abdomen is soft. There is no mass. Genitourinary:     General: Normal vulva. Exam position: Knee-chest position. Vagina: Vaginal discharge present. Cervix: Normal.      Comments: Moderate amount of thick, white, clumpy discharge  Musculoskeletal: Normal range of motion. Lymphadenopathy:      Cervical: No cervical adenopathy. Skin:     General: Skin is warm. Capillary Refill: Capillary refill takes less than 2 seconds. Findings: No rash. Neurological:      General: No focal deficit present. Mental Status: She is alert. Cranial Nerves: No cranial nerve deficit. Psychiatric:         Mood and Affect: Mood normal.         Behavior: Behavior normal.         No flowsheet data found. Lab Results   Component Value Date    GLUCOSE 87 12/29/2020       The ASCVD Risk score (Kortney Small, et al., 2013) failed to calculate for the following reasons: The 2013 ASCVD risk score is only valid for ages 36 to 78      There is no immunization history on file for this patient.     Health Maintenance   Topic Date Due    Hepatitis C screen  1993    Varicella vaccine (1 of 2 - 2-dose childhood series) 09/03/1994    HIV screen  09/03/2008    DTaP/Tdap/Td vaccine (1 - Tdap) 09/03/2012    Cervical cancer screen  09/03/2014    Flu vaccine (1) 09/01/2020    TSH testing  12/29/2021    Hepatitis A vaccine  Aged Out    Hepatitis B vaccine  Aged Out    Hib vaccine  Aged Out    Meningococcal (ACWY) vaccine  Aged Out    Pneumococcal 0-64 years Vaccine  Aged Out       ASSESSMENT/PLAN: 40-year-old here to establish care for annual physical with Pap smear. She had a history of abnormal Pap smears in the past her last Pap smear last year was within normal limits per patient. On pelvic exam patient was moderately uncomfortable, did have a lot of discharge on exam possibly yeast, will send affirm. Patient also tearful after exam stating that she is going through [place] lot\". Denies any stressful events and feels safe at home states she is struggling with mood. She does have iron deficiency anemia as well as Hashimoto's that is uncontrolled so this likely does play a huge role in her mood. We will replete her iron and B12 and she will continue with endocrinology. Behavioral health referral given to her today although she may not make an appointment. She does states she already enrolled in online self-help therapy. Spent quite some time reassuring patient on treatment course and what to expect especially in the setting of anemia and hypothyroidism, I think once we replete her vitamins and get her thyroid under control, her mood will improve. Patient agreeable and seemed reassured. 1. Well woman exam with routine gynecological exam  -     PAP SMEAR  -     VAGINAL PATHOGENS PROBE *A  2. Irritability  -     Ambulatory referral to Psychology  3. Iron deficiency anemia, unspecified iron deficiency anemia type  -     ferrous sulfate (IRON 325) 325 (65 Fe) MG tablet; Take 1 tablet by mouth daily (with breakfast), Disp-90 tablet, R-0Normal  -     polyethylene glycol (GLYCOLAX) 17 GM/SCOOP powder; Take 17 g by mouth daily, Disp-1530 g, R-1Normal  4. Vitamin D deficiency  5. Hashimoto's disease  6. Low serum vitamin B12  -     cyanocobalamin (CVS VITAMIN B12) 1000 MCG tablet; Take 1 tablet by mouth daily, Disp-30 tablet, R-1Normal  7. Need for hepatitis C screening test  -     HEPATITIS C ANTIBODY; Future  8. Screen for STD (sexually transmitted disease)  -     HEPATITIS C ANTIBODY;  Future

## 2021-01-13 ENCOUNTER — PATIENT MESSAGE (OUTPATIENT)
Dept: ENDOCRINOLOGY | Age: 26
End: 2021-01-13

## 2021-01-13 ENCOUNTER — VIRTUAL VISIT (OUTPATIENT)
Dept: PSYCHOLOGY | Age: 26
End: 2021-01-13
Payer: COMMERCIAL

## 2021-01-13 DIAGNOSIS — F43.21 ADJUSTMENT DISORDER WITH DEPRESSED MOOD: Primary | ICD-10-CM

## 2021-01-13 DIAGNOSIS — E04.2 MULTINODULAR GOITER: Primary | ICD-10-CM

## 2021-01-13 LAB
C. TRACHOMATIS DNA ,URINE: NEGATIVE
CANDIDA SPECIES, DNA PROBE: NORMAL
GARDNERELLA VAGINALIS, DNA PROBE: NORMAL
N. GONORRHOEAE DNA, URINE: NEGATIVE
TRICHOMONAS VAGINALIS DNA: NORMAL

## 2021-01-13 PROCEDURE — 90791 PSYCH DIAGNOSTIC EVALUATION: CPT | Performed by: PSYCHOLOGIST

## 2021-01-13 NOTE — PATIENT INSTRUCTIONS
What is Acceptance and Commitment Therapy (ACT):    ? The aim of ACT is to create a rich, full and meaningful life, while accepting the pain that inevitably goes with it. ? This type of therapy suggests that when people are struggling in their lives, it is often because they are   1. Living too much in the future or past instead of the present   2. Trying to push away negative thoughts/feelings/memories even though this is not possible  3. Buying into unhelpful thoughts/feelings   4. Defining themselves by their thoughts/feelings/memories/life events   5. Do not know what is important to them or their actions are not in line with what is important to them (e.g., spending time with family is an important value to Dawson Mallory, but he spends 15 hours a day working and does not see his family)  ?  Goals of ACT  o Help people live with unwanted personal experiences (thoughts, feelings, life events, memories) that are out of our control instead of constantly fighting to change or ignore those experiences  o Clarify what is important to a person (their values), and help that person take action in line with their values so that they can live a meaningful life

## 2021-01-14 NOTE — TELEPHONE ENCOUNTER
I informed patient about results. Advised to do another blood work in 4 to 6 weeks for thyroid. Patient has 1.6 cm thyroid nodule, ordered FNA.

## 2021-01-14 NOTE — TELEPHONE ENCOUNTER
From: Richard Urias  To: Michael Howard MD  Sent: 1/13/2021 3:42 PM EST  Subject: Test Results Question    Hi Dr. Twan English,    I had blood work done yesterday (1/12) for Dr. Sravanthi Hatfield. [facility] technician also ran my thyroid blood tests that you ordered I have in 4 weeks. Do I still need to return for blood work in 4 weeks or do the results available now provide you the answers you need?     Thank you,  Magdalena Carrasco

## 2021-01-18 DIAGNOSIS — B37.31 VAGINAL YEAST INFECTION: Primary | ICD-10-CM

## 2021-01-18 RX ORDER — FLUCONAZOLE 150 MG/1
150 TABLET ORAL ONCE
Qty: 1 TABLET | Refills: 0 | Status: SHIPPED | OUTPATIENT
Start: 2021-01-18 | End: 2021-01-18

## 2021-02-23 ENCOUNTER — OFFICE VISIT (OUTPATIENT)
Dept: ENDOCRINOLOGY | Age: 26
End: 2021-02-23
Payer: COMMERCIAL

## 2021-02-23 VITALS
DIASTOLIC BLOOD PRESSURE: 60 MMHG | HEART RATE: 58 BPM | SYSTOLIC BLOOD PRESSURE: 102 MMHG | OXYGEN SATURATION: 100 % | RESPIRATION RATE: 14 BRPM | BODY MASS INDEX: 30.3 KG/M2 | WEIGHT: 171 LBS | HEIGHT: 63 IN

## 2021-02-23 DIAGNOSIS — D64.9 ANEMIA, UNSPECIFIED TYPE: ICD-10-CM

## 2021-02-23 DIAGNOSIS — E04.2 MULTIPLE THYROID NODULES: ICD-10-CM

## 2021-02-23 DIAGNOSIS — E55.9 VITAMIN D DEFICIENCY: ICD-10-CM

## 2021-02-23 DIAGNOSIS — R68.82 LOW LIBIDO: ICD-10-CM

## 2021-02-23 DIAGNOSIS — E66.9 CLASS 1 OBESITY WITH BODY MASS INDEX (BMI) OF 30.0 TO 30.9 IN ADULT, UNSPECIFIED OBESITY TYPE, UNSPECIFIED WHETHER SERIOUS COMORBIDITY PRESENT: ICD-10-CM

## 2021-02-23 DIAGNOSIS — E06.3 HASHIMOTO'S THYROIDITIS: ICD-10-CM

## 2021-02-23 DIAGNOSIS — E53.8 B12 DEFICIENCY: ICD-10-CM

## 2021-02-23 DIAGNOSIS — N91.5 OLIGOMENORRHEA, UNSPECIFIED TYPE: ICD-10-CM

## 2021-02-23 DIAGNOSIS — E03.9 ACQUIRED HYPOTHYROIDISM: Primary | ICD-10-CM

## 2021-02-23 PROCEDURE — G8427 DOCREV CUR MEDS BY ELIG CLIN: HCPCS | Performed by: INTERNAL MEDICINE

## 2021-02-23 PROCEDURE — 1036F TOBACCO NON-USER: CPT | Performed by: INTERNAL MEDICINE

## 2021-02-23 PROCEDURE — G8417 CALC BMI ABV UP PARAM F/U: HCPCS | Performed by: INTERNAL MEDICINE

## 2021-02-23 PROCEDURE — G8484 FLU IMMUNIZE NO ADMIN: HCPCS | Performed by: INTERNAL MEDICINE

## 2021-02-23 PROCEDURE — 99214 OFFICE O/P EST MOD 30 MIN: CPT | Performed by: INTERNAL MEDICINE

## 2021-02-23 RX ORDER — LEVOTHYROXINE SODIUM 0.12 MG/1
TABLET ORAL
Qty: 30 TABLET | Refills: 2
Start: 2021-02-23 | End: 2021-05-21 | Stop reason: SDUPTHER

## 2021-02-23 NOTE — PROGRESS NOTES
SUBJECTIVE:  Dyan Dao is a 30 y.o. female who is being evaluated for hypothyroidism. 1. Acquired hypothyroidism    This started in 2019. Patient was diagnosed with hypothyroidism. The problem has been gradually worsening. Previous thyroid studies include: TSH and free thyroxine. Patient started medication in 2019. Currently patient is on: levothyroxine. Misses 50% doses a month. Current complaints: fatigue, irritability, mood swings, hair loss  Has fatigue, moderate in intensity  Fatigue worse in the afternoon  Has sleepiness    2. Hashimoto's thyroiditis  Complains of fatigue, weight gain  Positive TPO antibodies    3. Oligomenorrhea, unspecified type  Irregular periods without BCP and with BCP, usually skips 1 month  Started periods at 13 yo. Periods used to be heavy, not as heavy at this time. 4. Obesity  Gained weight 20 pounds over 2 years despite diet and exercise  Has   Celiac panel negative  On [facility] weight management program, lost some weight    5. Multiple thyroid nodules  History of obstructive symptoms: difficulty swallowing No, changes in voice/hoarseness No.  History of radiation to patient's neck: No  Resent iodine exposure: No  Family history includes hypothyroidism  Family history of thyroid cancer: No    6. Low libido  Patient complains of low libido and is concerned that its abnormal at her age    9. Anemia, unspecified type  Has fatigue    8. B12 deficiency  No numbness    9.   Vitamin D deficiency   Has fatigue    THYROID ULTRASOUND   History : Goiter       COMMENTS :    Thyroid size : Right lobe 4.0 x 1.2 x 1.4 cm                                     Left lobe 3.6 x 1.2 x 1.4 cm   Echotexture : Heterogeneous   Nodules :    16 x 12 x 9 mm heterogeneously hypoechoic nodule in the right inferior lobe - ACR TR 3   7 x 4 x 6 mm hyperechoic nodule in the left inferior lobe - ACR TR 3                   Impression   IMPRESSION :       Six-month follow-up ultrasound to document stability of hypoechoic nodule in the right lobe.       Heterogeneous echotexture noted. This can be seen with thyroiditis. History reviewed. No pertinent past medical history. Patient Active Problem List    Diagnosis Date Noted    Vitamin D deficiency 01/07/2021    Hashimoto's thyroiditis 12/29/2020    Acquired hypothyroidism 12/29/2020    Anemia 12/29/2020    B12 deficiency 12/29/2020    Goiter 12/29/2020    Class 1 obesity with body mass index (BMI) of 30.0 to 30.9 in adult 12/29/2020    Oligomenorrhea 12/29/2020    Low libido 12/29/2020     History reviewed. No pertinent surgical history.   Family History   Problem Relation Age of Onset    Hypothyroidism Mother     Bipolar Disorder Mother      Social History     Socioeconomic History    Marital status: Single     Spouse name: None    Number of children: None    Years of education: None    Highest education level: None   Occupational History    None   Social Needs    Financial resource strain: Not hard at all   [employer] insecurity     Worry: Never true     Inability: Never true    Transportation needs     Medical: No     Non-medical: No   Tobacco Use    Smoking status: Never Smoker    Smokeless tobacco: Never Used   Substance and Sexual Activity    Alcohol use: Yes     Comment: 2-3 per week    Drug use: Never    Sexual activity: None   Lifestyle    Physical activity     Days per week: None     Minutes per session: None    Stress: None   Relationships    Social connections     Talks on phone: None     Gets together: None     Attends Jewish service: None     Active member of club or organization: None     Attends meetings of clubs or organizations: None     Relationship status: None    Intimate partner violence     Fear of current or ex partner: None     Emotionally abused: None     Physically abused: None     Forced sexual activity: None   Other Topics Concern    None   Social History Narrative    None     Current Outpatient Medications   Medication Sig Dispense Refill    levothyroxine (SYNTHROID) 125 MCG tablet 1 tablet 6 days a week, one and a half tablet 1 day a week 30 tablet 2    ferrous sulfate (IRON 325) 325 (65 Fe) MG tablet Take 1 tablet by mouth daily (with breakfast) 90 tablet 0    cyanocobalamin ([employer] VITAMIN B12) 1000 MCG tablet Take 1 tablet by mouth daily 30 tablet 1    vitamin D3 (CHOLECALCIFEROL) 25 MCG (1000 UT) TABS tablet Take 2 tablets by mouth daily 90 tablet 1    norethindrone-ethinyl estradiol (JUNEL FE 1/20) 1-20 MG-MCG per tablet Take 1 tablet by mouth       No current facility-administered medications for this visit.       No Known Allergies  Family Status   Relation Name Status    Mother  (Not Specified)       Review of Systems:  Constitutional: has fatigue, no fever, has recent weight gain, no recent weight loss, no changes in appetite  Eyes: no eye pain, no change in vision, no eye redness, no eye irritation, no double vision  Ears, nose, throat: no nasal congestion, no sore throat, no earache, no decrease in hearing, no hoarseness, no dry mouth, no sinus problems, no difficulty swallowing, no neck lumps, no dental problems, no mouth sores, no ringing in ears  Pulmonary: no shortness of breath, no wheezing, no dyspnea on exertion, no cough  Cardiovascular: no chest pain, no lower extremity edema, no orthopnea, no intermittent leg claudication, no palpitations  Gastrointestinal: no abdominal pain, no nausea, no vomiting, no diarrhea, no constipation, no dysphagia, no heartburn, no bloating  Genitourinary: no dysuria, no urinary incontinence, no urinary hesitancy, no urinary frequency, no feelings of urinary urgency, no nocturia  Musculoskeletal: no joint swelling, no joint stiffness, no joint pain, no muscle cramps, no muscle pain, no bone pain  Integument/Breast: has hair loss, no skin rashes, no skin lesions, no itching, no dry skin  Neurological: no numbness, no tingling, no weakness, no confusion, has headaches, no dizziness, no fainting, no tremors, no decrease in memory, no balance problems  Psychiatric: has anxiety, has depression, no insomnia  Hematologic/Lymphatic: no tendency for easy bleeding, no swollen lymph nodes, no tendency for easy bruising  Immunology: no seasonal allergies, no frequent infections, no frequent illnesses  Endocrine: has temperature intolerance    /60   Pulse 58   Resp 14   Ht 5' 3\" (1.6 m)   Wt 171 lb (77.6 kg)   LMP 02/19/2021   SpO2 100%   BMI 30.29 kg/m²    Wt Readings from Last 3 Encounters:   02/23/21 171 lb (77.6 kg)   01/12/21 170 lb 9.6 oz (77.4 kg)   01/07/21 169 lb (76.7 kg)     Body mass index is 30.29 kg/m².     OBJECTIVE:  Constitutional: no acute distress, well appearing and well nourished  Psychiatric: oriented to person, place and time, judgement and insight and normal, recent and remote memory and intact and mood and affect are normal  Skin: skin and subcutaneous tissue is normal without mass, normal turgor  Head and Face: examination of head and face revealed no abnormalities  Eyes: no lid or conjunctival swelling, erythema or discharge, pupils are normal, equal, round, reactive to light  Ears/Nose: external inspection of ears and nose revealed no abnormalities, hearing is grossly normal  Oropharynx/Mouth/Face: lips, tongue and gums are normal with no lesions, the voice quality was normal  Neck: neck is supple and symmetric, with midline trachea and no masses, thyroid is pebbly  Lymphatics: normal cervical lymph nodes, normal supraclavicular nodes  Pulmonary: no increased work of breathing or signs of respiratory distress, lungs are clear to auscultation  Cardiovascular: normal heart rate and rhythm, normal S1 and S2, no murmurs and pedal pulses and 2+ bilaterally, No edema  Abdomen: abdomen is soft, non-tender with no masses  Musculoskeletal: normal gait and station and exam of the digits and nails are normal  Neurological: normal coordination and normal general cortical function      Lab Review:    Lab Results   Component Value Date    WBC 4.7 12/29/2020    HGB 12.9 12/29/2020    HCT 39.5 12/29/2020    MCV 82.9 12/29/2020     12/29/2020     Lab Results   Component Value Date     12/29/2020    K 4.1 12/29/2020     12/29/2020    CO2 26 12/29/2020    BUN 13 12/29/2020    CREATININE 0.7 12/29/2020    GLUCOSE 87 12/29/2020    CALCIUM 9.2 12/29/2020    PROT 7.9 12/29/2020    LABALBU 4.5 12/29/2020    BILITOT 0.3 12/29/2020    ALKPHOS 29 12/29/2020    AST 28 12/29/2020    ALT 18 12/29/2020    LABGLOM >60 12/29/2020    GFRAA >60 12/29/2020    AGRATIO 1.3 12/29/2020    GLOB 3.4 12/29/2020     Lab Results   Component Value Date    TSH 3.62 02/19/2021    FT3 3.0 02/19/2021     No results found for: LABA1C  No results found for: EAG  No results found for: CHOL  No results found for: TRIG  No results found for: HDL  No results found for: LDLCHOLESTEROL, LDLCALC  No results found for: LABVLDL, VLDL  No results found for: CHOLHDLRATIO  No results found for: Oscar Hunting  Lab Results   Component Value Date    VITD25 24.4 12/29/2020        ASSESSMENT/PLAN:    1. Acquired hypothyroidism    TSH uncontrolled, 34.14-3.62  Increase levothyroxine 0.125 mg 6 days a week, one and a half tablet 1 day a week  Counseled patient about different thyroid medications, their side effects and benefits. Discussed several treatment options when thyroid is controlled  - T3, Free; Future  - T4, Free; Future  - TSH without Reflex; Future  - T3, Reverse; Future    2. Hashimoto's thyroiditis  - Thyroid Peroxidase Antibody; Future  - Anti-Thyroglobulin Antibody; Future    3. Anemia, unspecified type  Low Ferritin 9.8  - Vitamin B12 & Folate; Future  - CBC; Future  - Iron and TIBC; Future  - Ferritin; Future  - Vitamin D 25 Hydroxy; Future    4. B12 deficiency  B12 173  Will see PCP  - Vitamin B12 & Folate; Future    5.  Multiple thyroid nodules  - Thyroid Peroxidase Antibody; Future  - Anti-Thyroglobulin Antibody; Future  - US HEAD NECK SOFT TISSUE THYROID; Future    6. Obesity  - Comprehensive Metabolic Panel; Future  Patient is interested in weight loss medications, but advised to control thyroid first  Diet and exercise    7. Oligomenorrhea, unspecified type  Follow    8. Low libido  - DHEA-Sulfate; Future  - Testosterone, free, total; Future    9. Vitamin D deficiency  25OHvitamin D 24.4  Has fatigue  Start vitamin D 2000 international units daily  New problem    Reviewed and/or ordered clinical lab results Yes  Reviewed and/or ordered radiology tests Yes   Reviewed and/or ordered other diagnostic tests No  Discussed test results with performing physician No  Independently reviewed image, tracing, or specimen No  Made a decision to obtain old records No  Reviewed and summarized old records No   TSH 26.7-8.2-2.8  Hemoglobin A1c 5.4  B12 96  TPO antibody 734.9  Thyroglobulin antibody 1.9  Celiac panel negative  Hb 11.2  Obtained history from other than patient No    Dyan Dao was counseled regarding symptoms of thyroid, oligomenorrhea, Hashimoto's thyroiditis, low libido diagnosis, course and complications of disease if inadequately treated, side effects of medications, diagnosis, treatment options, and prognosis, risks, benefits, complications, and alternatives of treatment, labs, imaging and other studies and treatment targets and goals, different thyroid medications, side effects and benefits. She understands instructions and counseling. Return in about 3 months (around 5/23/2021) for thyroid problems.     Electronically signed by Cresencio Mendosa MD on 2/23/2021 at 11:20 AM

## 2021-03-23 RX ORDER — NORETHINDRONE ACETATE AND ETHINYL ESTRADIOL 1MG-20(21)
1 KIT ORAL DAILY
Qty: 1 PACKET | Refills: 1 | Status: CANCELLED | OUTPATIENT
Start: 2021-03-23

## 2021-05-21 DIAGNOSIS — E03.9 ACQUIRED HYPOTHYROIDISM: ICD-10-CM

## 2021-05-21 RX ORDER — LEVOTHYROXINE SODIUM 0.12 MG/1
TABLET ORAL
Qty: 35 TABLET | Refills: 2 | Status: SHIPPED | OUTPATIENT
Start: 2021-05-21 | End: 2021-05-28

## 2021-05-28 ENCOUNTER — OFFICE VISIT (OUTPATIENT)
Dept: ENDOCRINOLOGY | Age: 26
End: 2021-05-28
Payer: COMMERCIAL

## 2021-05-28 VITALS
HEART RATE: 74 BPM | WEIGHT: 175 LBS | HEIGHT: 63 IN | DIASTOLIC BLOOD PRESSURE: 82 MMHG | BODY MASS INDEX: 31.01 KG/M2 | OXYGEN SATURATION: 99 % | SYSTOLIC BLOOD PRESSURE: 110 MMHG

## 2021-05-28 DIAGNOSIS — E66.9 CLASS 1 OBESITY WITH BODY MASS INDEX (BMI) OF 31.0 TO 31.9 IN ADULT, UNSPECIFIED OBESITY TYPE, UNSPECIFIED WHETHER SERIOUS COMORBIDITY PRESENT: ICD-10-CM

## 2021-05-28 DIAGNOSIS — E04.2 MULTINODULAR GOITER: ICD-10-CM

## 2021-05-28 DIAGNOSIS — E06.3 HASHIMOTO'S THYROIDITIS: ICD-10-CM

## 2021-05-28 DIAGNOSIS — E55.9 VITAMIN D DEFICIENCY: ICD-10-CM

## 2021-05-28 DIAGNOSIS — D64.9 ANEMIA, UNSPECIFIED TYPE: ICD-10-CM

## 2021-05-28 DIAGNOSIS — R68.82 LOW LIBIDO: ICD-10-CM

## 2021-05-28 DIAGNOSIS — E53.8 B12 DEFICIENCY: ICD-10-CM

## 2021-05-28 DIAGNOSIS — N91.5 OLIGOMENORRHEA, UNSPECIFIED TYPE: ICD-10-CM

## 2021-05-28 DIAGNOSIS — E03.9 ACQUIRED HYPOTHYROIDISM: Primary | ICD-10-CM

## 2021-05-28 PROCEDURE — 1036F TOBACCO NON-USER: CPT | Performed by: INTERNAL MEDICINE

## 2021-05-28 PROCEDURE — G8417 CALC BMI ABV UP PARAM F/U: HCPCS | Performed by: INTERNAL MEDICINE

## 2021-05-28 PROCEDURE — 99214 OFFICE O/P EST MOD 30 MIN: CPT | Performed by: INTERNAL MEDICINE

## 2021-05-28 PROCEDURE — G8427 DOCREV CUR MEDS BY ELIG CLIN: HCPCS | Performed by: INTERNAL MEDICINE

## 2021-05-28 RX ORDER — LEVOTHYROXINE SODIUM 137 UG/1
137 TABLET ORAL DAILY
Qty: 30 TABLET | Refills: 3 | Status: SHIPPED | OUTPATIENT
Start: 2021-05-28 | End: 2021-09-24

## 2021-05-28 RX ORDER — COLLAGEN, HYDROLYSATE (BOVINE) 100 %
POWDER (GRAM) MISCELLANEOUS
COMMUNITY

## 2021-05-28 NOTE — PROGRESS NOTES
SUBJECTIVE:  Iam Ambrocio is a 30 y.o. female who is being evaluated for hypothyroidism. 1. Acquired hypothyroidism    This started in 2019. Patient was diagnosed with hypothyroidism. The problem has been gradually worsening. Previous thyroid studies include: TSH and free thyroxine. Patient started medication in 2019. Currently patient is on: levothyroxine. Misses  0doses a month. Current complaints: fatigue, irritability, mood swings, hair loss  Has fatigue, moderate in intensity  Fatigue worse in the afternoon  Has sleepiness    2. Hashimoto's thyroiditis  Complains of fatigue, weight gain  Positive TPO antibodies    3. Oligomenorrhea, unspecified type  Irregular periods without BCP and with BCP, usually skips 1 month  Started periods at 13 yo. Periods used to be heavy, not as heavy at this time. 4. Obesity  Gained weight 20 pounds over 2 years despite diet and exercise  Has , but did not lost weight  Celiac panel negative  On [facility] weight management program, lost some weight    5. Multinodular goiter  History of obstructive symptoms: difficulty swallowing No, changes in voice/hoarseness No.  History of radiation to patient's neck: No  Resent iodine exposure: No  Family history includes hypothyroidism  Family history of thyroid cancer: No    6. Low libido  Patient complains of low libido and is concerned that its abnormal at her age    9. Anemia, unspecified type  Has fatigue    8. B12 deficiency  No numbness    9.   Vitamin D deficiency   Has fatigue    THYROID ULTRASOUND   History : Goiter       COMMENTS :    Thyroid size : Right lobe 4.0 x 1.2 x 1.4 cm                                     Left lobe 3.6 x 1.2 x 1.4 cm   Echotexture : Heterogeneous   Nodules :    16 x 12 x 9 mm heterogeneously hypoechoic nodule in the right inferior lobe - ACR TR 3   7 x 4 x 6 mm hyperechoic nodule in the left inferior lobe - ACR TR 3                   Impression   IMPRESSION :       Six-month of Social Gatherings with Friends and Family:     Attends [employer]oration[employer] Services:     Active Member of Clubs or Organizations:     Attends Club or Organization Meetings:     Marital Status:    Intimate Partner Violence:     Fear of Current or Ex-Partner:     Emotionally Abused:     Physically Abused:     Sexually Abused:      Current Outpatient Medications   Medication Sig Dispense Refill    Collagen Hydrolysate POWD by Does not apply route      levothyroxine (SYNTHROID) 137 MCG tablet Take 1 tablet by mouth Daily 30 tablet 3    norethindrone-ethinyl estradiol (JUNEL FE 1/20) 1-20 MG-MCG per tablet Take 1 tablet by mouth daily 30 tablet 11    ferrous sulfate (IRON 325) 325 (65 Fe) MG tablet Take 1 tablet by mouth daily (with breakfast) 90 tablet 0    cyanocobalamin ([employer]S VITAMIN B12) 1000 MCG tablet Take 1 tablet by mouth daily 30 tablet 1    vitamin D3 (CHOLECALCIFEROL) 25 MCG (1000 UT) TABS tablet Take 2 tablets by mouth daily 90 tablet 1     No current facility-administered medications for this visit.      No Known Allergies  Family Status   Relation Name Status    Mother  (Not Specified)       Review of Systems:  Constitutional: has fatigue, no fever, has recent weight gain, no recent weight loss, no changes in appetite  Eyes: no eye pain, no change in vision, no eye redness, no eye irritation, no double vision  Ears, nose, throat: no nasal congestion, no sore throat, no earache, no decrease in hearing, no hoarseness, no dry mouth, no sinus problems, no difficulty swallowing, no neck lumps, no dental problems, no mouth sores, no ringing in ears  Pulmonary: no shortness of breath, no wheezing, no dyspnea on exertion, no cough  Cardiovascular: no chest pain, no lower extremity edema, no orthopnea, no intermittent leg claudication, no palpitations  Gastrointestinal: no abdominal pain, no nausea, no vomiting, no diarrhea, no constipation, no dysphagia, no heartburn, no bloating  Genitourinary: no dysuria, no urinary incontinence, no urinary hesitancy, no urinary frequency, no feelings of urinary urgency, no nocturia  Musculoskeletal: no joint swelling, no joint stiffness, no joint pain, no muscle cramps, no muscle pain, no bone pain  Integument/Breast: has hair loss, no skin rashes, no skin lesions, no itching, no dry skin  Neurological: no numbness, no tingling, no weakness, no confusion, has headaches, no dizziness, no fainting, no tremors, no decrease in memory, no balance problems  Psychiatric: has anxiety, has depression, no insomnia  Hematologic/Lymphatic: no tendency for easy bleeding, no swollen lymph nodes, no tendency for easy bruising  Immunology: no seasonal allergies, no frequent infections, no frequent illnesses  Endocrine: has temperature intolerance    /82   Pulse 74   Ht 5' 3\" (1.6 m)   Wt 175 lb (79.4 kg)   LMP 05/14/2021   SpO2 99%   BMI 31.00 kg/m²    Wt Readings from Last 3 Encounters:   05/28/21 175 lb (79.4 kg)   02/23/21 171 lb (77.6 kg)   01/12/21 170 lb 9.6 oz (77.4 kg)     Body mass index is 31 kg/m².     OBJECTIVE:  Constitutional: no acute distress, well appearing and well nourished  Psychiatric: oriented to person, place and time, judgement and insight and normal, recent and remote memory and intact and mood and affect are normal  Skin: skin and subcutaneous tissue is normal without mass, normal turgor  Head and Face: examination of head and face revealed no abnormalities  Eyes: no lid or conjunctival swelling, erythema or discharge, pupils are normal, equal, round, reactive to light  Ears/Nose: external inspection of ears and nose revealed no abnormalities, hearing is grossly normal  Oropharynx/Mouth/Face: lips, tongue and gums are normal with no lesions, the voice quality was normal  Neck: neck is supple and symmetric, with midline trachea and no masses, thyroid is pebbly  Lymphatics: normal cervical lymph nodes, normal supraclavicular nodes  Pulmonary: no increased work of breathing or signs of respiratory distress, lungs are clear to auscultation  Cardiovascular: normal heart rate and rhythm, normal S1 and S2, no murmurs and pedal pulses and 2+ bilaterally, No edema  Abdomen: abdomen is soft, non-tender with no masses  Musculoskeletal: normal gait and station and exam of the digits and nails are normal  Neurological: normal coordination and normal general cortical function      Lab Review:    Lab Results   Component Value Date    WBC 4.7 12/29/2020    HGB 12.9 12/29/2020    HCT 39.5 12/29/2020    MCV 82.9 12/29/2020     12/29/2020     Lab Results   Component Value Date     12/29/2020    K 4.1 12/29/2020     12/29/2020    CO2 26 12/29/2020    BUN 13 12/29/2020    CREATININE 0.7 12/29/2020    GLUCOSE 87 12/29/2020    CALCIUM 9.2 12/29/2020    PROT 7.9 12/29/2020    LABALBU 4.5 12/29/2020    BILITOT 0.3 12/29/2020    ALKPHOS 29 12/29/2020    AST 28 12/29/2020    ALT 18 12/29/2020    LABGLOM >60 12/29/2020    GFRAA >60 12/29/2020    AGRATIO 1.3 12/29/2020    GLOB 3.4 12/29/2020     Lab Results   Component Value Date    TSH 10.48 05/24/2021    FT3 2.2 05/24/2021     No results found for: LABA1C  No results found for: EAG  No results found for: CHOL  No results found for: TRIG  No results found for: HDL  No results found for: LDLCHOLESTEROL, LDLCALC  No results found for: LABVLDL, VLDL  No results found for: CHOLHDLRATIO  No results found for: Trinda Ángela  Lab Results   Component Value Date    VITD25 24.4 12/29/2020        ASSESSMENT/PLAN:    1. Acquired hypothyroidism  Worsening  TSH uncontrolled, 34.14-3.6210.48  Increase levothyroxine 0.137 mg daily  Counseled patient about different thyroid medications, their side effects and benefits. Discussed several treatment options when thyroid is controlled  - T3, Free; Future  - T4, Free; Future  - TSH without Reflex; Future  - T3, Reverse; Future    2.  Hashimoto's thyroiditis  - Thyroid Peroxidase Antibody; Future  - Anti-Thyroglobulin Antibody; Future    3. Anemia, unspecified type  Ferritin 9.835.7  - Vitamin B12 & Folate; Future  - CBC; Future  - Iron and TIBC; Future  - Ferritin; Future  - Vitamin D 25 Hydroxy; Future    4. B12 deficiency  B12 173231  Will see PCP  - Vitamin B12 & Folate; Future    5. Multinodular goiter  FNA of the right 1.6 cm nodule  1/2021 thyroid ultrasound right 1.6 cm, left 0.7 cm nodules  - Thyroid Peroxidase Antibody; Future  - Anti-Thyroglobulin Antibody; Future  - US HEAD NECK SOFT TISSUE THYROID; Future    6. Obesity  - Comprehensive Metabolic Panel; Future  Patient is interested in weight loss medications, but advised to control thyroid first  Diet and exercise  Phentermine next mina.    7. Oligomenorrhea, unspecified type  Follow    8. Low libido  - DHEA-Sulfate; Future  - Testosterone, free, total; Future    9. Vitamin D deficiency  25OHvitamin D 24.4  Has fatigue  Vitamin D 2000 international units daily  New problem    Reviewed and/or ordered clinical lab results Yes  Reviewed and/or ordered radiology tests Yes   Reviewed and/or ordered other diagnostic tests No  Discussed test results with performing physician No  Independently reviewed image, tracing, or specimen No  Made a decision to obtain old records No  Reviewed and summarized old records No   TSH 26.7-8.2-2.8  Hemoglobin A1c 5.4  B12 96  TPO antibody 734.9  Thyroglobulin antibody 1.9  Celiac panel negative  Hb 11.2  Obtained history from other than patient No    Tyra Dusty was counseled regarding symptoms of thyroid, oligomenorrhea, Hashimoto's thyroiditis, low libido diagnosis, course and complications of disease if inadequately treated, side effects of medications, diagnosis, treatment options, and prognosis, risks, benefits, complications, and alternatives of treatment, labs, imaging and other studies and treatment targets and goals, different thyroid medications, side effects and benefits.   She understands instructions and counseling. Return in about 6 weeks (around 7/9/2021) for thyroid problems.     Electronically signed by Merry Kramer MD on 5/28/2021 at 9:38 AM

## 2021-06-15 ENCOUNTER — HOSPITAL ENCOUNTER (OUTPATIENT)
Dept: GENERAL RADIOLOGY | Age: 26
Discharge: HOME | End: 2021-06-15
Payer: COMMERCIAL

## 2021-06-15 ENCOUNTER — NURSE TRIAGE (OUTPATIENT)
Dept: OTHER | Facility: CLINIC | Age: 26
End: 2021-06-15

## 2021-06-15 ENCOUNTER — TELEPHONE (OUTPATIENT)
Dept: PRIMARY CARE CLINIC | Age: 26
End: 2021-06-15

## 2021-06-15 ENCOUNTER — OFFICE VISIT (OUTPATIENT)
Dept: PRIMARY CARE CLINIC | Age: 26
End: 2021-06-15
Payer: COMMERCIAL

## 2021-06-15 VITALS
HEART RATE: 65 BPM | WEIGHT: 175 LBS | SYSTOLIC BLOOD PRESSURE: 122 MMHG | DIASTOLIC BLOOD PRESSURE: 82 MMHG | OXYGEN SATURATION: 99 % | TEMPERATURE: 98.7 F | HEIGHT: 63 IN | BODY MASS INDEX: 31.01 KG/M2

## 2021-06-15 DIAGNOSIS — S99.922A INJURY OF LEFT FOOT, INITIAL ENCOUNTER: Primary | ICD-10-CM

## 2021-06-15 DIAGNOSIS — S99.922A INJURY OF LEFT FOOT, INITIAL ENCOUNTER: ICD-10-CM

## 2021-06-15 DIAGNOSIS — S92.355A NONDISP FX OF FIFTH METATARSAL BONE, LEFT FOOT, INIT: Primary | ICD-10-CM

## 2021-06-15 PROCEDURE — 73630 X-RAY EXAM OF FOOT: CPT

## 2021-06-15 PROCEDURE — G8417 CALC BMI ABV UP PARAM F/U: HCPCS | Performed by: STUDENT IN AN ORGANIZED HEALTH CARE EDUCATION/TRAINING PROGRAM

## 2021-06-15 PROCEDURE — G8427 DOCREV CUR MEDS BY ELIG CLIN: HCPCS | Performed by: STUDENT IN AN ORGANIZED HEALTH CARE EDUCATION/TRAINING PROGRAM

## 2021-06-15 PROCEDURE — 1036F TOBACCO NON-USER: CPT | Performed by: STUDENT IN AN ORGANIZED HEALTH CARE EDUCATION/TRAINING PROGRAM

## 2021-06-15 PROCEDURE — 99214 OFFICE O/P EST MOD 30 MIN: CPT | Performed by: STUDENT IN AN ORGANIZED HEALTH CARE EDUCATION/TRAINING PROGRAM

## 2021-06-15 RX ORDER — IBUPROFEN 800 MG/1
800 TABLET ORAL 2 TIMES DAILY PRN
Qty: 180 TABLET | Refills: 1 | Status: SHIPPED | OUTPATIENT
Start: 2021-06-15 | End: 2021-12-07

## 2021-06-15 NOTE — TELEPHONE ENCOUNTER
Reason for Disposition   Followed an ankle or foot injury   Can't stand (bear weight) or walk (e.g., 4 steps)    Answer Assessment - Initial Assessment Questions  1. ONSET: \"When did the pain start? \"       After fall this morning-- tripped on treadmill. Banks crack. Can't walk. 2. LOCATION: \"Where is the pain located? \"       Left foot--\"bumps on top of foot\"    3. PAIN: \"How bad is the pain? \"    (Scale 1-10; or mild, moderate, severe)    -  MILD (1-3): doesn't interfere with normal activities     -  MODERATE (4-7): interferes with normal activities (e.g., work or school) or awakens from sleep, limping     -  SEVERE (8-10): excruciating pain, unable to do any normal activities, unable to walk      Severe-- unable to walk on it but states 6/10    4. WORK OR EXERCISE: \"Has there been any recent work or exercise that involved this part of the body? \"      No    5. CAUSE: \"What do you think is causing the foot pain? \"      Fall    6. OTHER SYMPTOMS: \"Do you have any other symptoms? \" (e.g., leg pain, rash, fever, numbness)      Numbness dfistal 3 toes. 7. PREGNANCY: \"Is there any chance you are pregnant? \" \"When was your last menstrual period? \"  Menstruating now. Protocols used: FOOT PAIN-ADULT-[place], ANKLE AND FOOT INJURY-ADULT-[place]    Received call from Brittany at [facility] with Red Flag Complaint. Brief description of triage: tripped over treadmill this am and now with pain, \"bumps\" on top of distal left foot, numbness left lateral 3 toes. Unable to walk. Triage indicates for patient to have a PCP 2nd level triage. This writer spoke with Dr. Meli Beltran who recommended that pt come into office now. Pt agrees and will leave as soon as her fiance picks her up. Care advice provided, patient verbalizes understanding; denies any other questions or concerns; instructed to call back for any new or worsening symptoms. Attention Provider:   Thank you for allowing me to participate in the care of your patient. The patient was connected to triage in response to information provided to the [facility]. Please do not respond through this encounter as the response is not directed to a shared pool.

## 2021-06-15 NOTE — PROGRESS NOTES
Veronica Torres (:  1995) is a 30 y.o. female,Established patient, here for evaluation of the following chief complaint(s): Foot Injury (fell around 9am this morning, hurts to put pressure on left foot, swelling has gone down but there is still a bump)         ASSESSMENT/PLAN:  1. Injury of left foot, initial encounter  -     ibuprofen (ADVIL;MOTRIN) 800 MG tablet; Take 1 tablet by mouth 2 times daily as needed for Pain, Disp-180 tablet, R-1Normal  -     XR FOOT LEFT (MIN 3 VIEWS); Future  -     DME Order for Crutches as OP; Future    Concern for fracture versus strain, recommend nonweightbearing until we can get imaging back. Ice, elevation, compressio and crutches  NSAIDs for pain  Return if symptoms worsen or fail to improve. Subjective   SUBJECTIVE/OBJECTIVE:  HPI  Fell onto right knee but twisted left ankle, happened around 9 am this morning some tingling in last 3 toes, has been elevating, no pain medicine yet, cannot bear any weight, no previous injury before. No other pain other than the left top side of her left foot. Review of Systems   All other systems reviewed and are negative. Objective   Physical Exam  Vitals reviewed. Constitutional:       General: She is not in acute distress. Appearance: Normal appearance. She is not ill-appearing. HENT:      Head: Normocephalic and atraumatic. Right Ear: External ear normal.      Left Ear: External ear normal.   Eyes:      General: No scleral icterus. Right eye: No discharge. Left eye: No discharge. Extraocular Movements: Extraocular movements intact. Conjunctiva/sclera: Conjunctivae normal.   Pulmonary:      Effort: Pulmonary effort is normal. No respiratory distress. Musculoskeletal:      Cervical back: Neck supple.       Comments: No tenderness palpation between bilateral knees  Right extremity right foot within normal limits  Left foot no tenderness palpation along medial lateral malleolus, no tibial TTP, moves all toes well, tenderness palpation along fourth and fifth metatarsal dorsal midfoot with some tenderness palpation   Skin:     Coloration: Skin is not jaundiced. Findings: No lesion or rash. Neurological:      Mental Status: She is alert. Cranial Nerves: No cranial nerve deficit. Coordination: Coordination normal.   Psychiatric:         Mood and Affect: Mood normal.                  An electronic signature was used to authenticate this note.     --Rayna Beatty MD

## 2021-06-16 ENCOUNTER — TELEPHONE (OUTPATIENT)
Dept: ORTHOPEDIC SURGERY | Age: 26
End: 2021-06-16

## 2021-06-16 ENCOUNTER — OFFICE VISIT (OUTPATIENT)
Dept: ORTHOPEDIC SURGERY | Age: 26
End: 2021-06-16
Payer: COMMERCIAL

## 2021-06-16 VITALS — TEMPERATURE: 97.6 F | WEIGHT: 167 LBS | BODY MASS INDEX: 29.59 KG/M2 | HEIGHT: 63 IN

## 2021-06-16 DIAGNOSIS — S92.352A CLOSED FRACTURE OF BASE OF FIFTH METATARSAL BONE OF LEFT FOOT: ICD-10-CM

## 2021-06-16 DIAGNOSIS — M79.672 LEFT FOOT PAIN: Primary | ICD-10-CM

## 2021-06-16 PROCEDURE — G8417 CALC BMI ABV UP PARAM F/U: HCPCS | Performed by: ORTHOPAEDIC SURGERY

## 2021-06-16 PROCEDURE — L4361 PNEUMA/VAC WALK BOOT PRE OTS: HCPCS | Performed by: ORTHOPAEDIC SURGERY

## 2021-06-16 PROCEDURE — G8427 DOCREV CUR MEDS BY ELIG CLIN: HCPCS | Performed by: ORTHOPAEDIC SURGERY

## 2021-06-16 PROCEDURE — 99203 OFFICE O/P NEW LOW 30 MIN: CPT | Performed by: ORTHOPAEDIC SURGERY

## 2021-06-16 PROCEDURE — 1036F TOBACCO NON-USER: CPT | Performed by: ORTHOPAEDIC SURGERY

## 2021-06-16 NOTE — PROGRESS NOTES
Date:  2021    Name:  Pamela Urias  Address:  [address] [place] [address] O  [address] [place] [address]    :  1995      Age:   30 y.o.    SSN:  [id number]      Medical Record Number:  <[id number]>    Reason for Visit:    Chief Complaint    Foot Pain (new patient left foot. )      DOS:2021     HPI: Pamela Urias is a 30 y.o. female here today for regarding her left foot. Patient had a trip and fall yesterday in which she feels she had a an inversion type injury. Had immediate pain and swelling and difficulty weightbearing. She was evaluated by her primary care physician with x-rays and told she had a base of her fifth metatarsal fracture. She presents today for further evaluation and management. She gives the above story. She has some intermittent numbness and tingling. She has been using crutches and taking Motrin. She has pain with weightbearing. No other complaints. Pain Assessment  Location of Pain: Foot  Location Modifiers: Left  Severity of Pain: 3  Quality of Pain: Dull, Aching  Duration of Pain: Persistent  Frequency of Pain: Intermittent  Aggravating Factors:  (weightbearing)  Limiting Behavior: Yes  Relieving Factors: Rest  Result of Injury: Yes  Work-Related Injury: No  Are there other pain locations you wish to document?: No  ROS: All systems reviewed on patient intake form. Pertinent items are noted in HPI. History reviewed. No pertinent past medical history. History reviewed. No pertinent surgical history.     Family History   Problem Relation Age of Onset    Hypothyroidism Mother     Bipolar Disorder Mother        Social History     Socioeconomic History    Marital status: Single     Spouse name: None    Number of children: None    Years of education: None    Highest education level: None   Occupational History    None   Tobacco Use    Smoking status: Never Smoker    Smokeless tobacco: Never Used   Vaping Use    Vaping Use: Never used   Substance and Sexual Activity    Alcohol use: Yes     Comment: 2-3 per week    Drug use: Never    Sexual activity: None   Other Topics Concern    None   Social History Narrative    None     Social Determinants of Health     Financial Resource Strain: Low Risk     Difficulty of Paying Living Expenses: Not hard at all   Food Insecurity: No Food Insecurity    Worried About Running Out of Food in the Last Year: Never true    Edgard of Food in the Last Year: Never true   Transportation Needs: No Transportation Needs    Lack of Transportation (Medical): No    Lack of Transportation (Non-Medical):  No   Physical Activity:     Days of Exercise per Week:     Minutes of Exercise per Session:    Stress:     Feeling of Stress :    Social Connections:     Frequency of Communication with Friends and Family:     Frequency of Social Gatherings with Friends and Family:     Attends Restorationism Services:     Active Member of Clubs or Organizations:     Attends Club or Organization Meetings:     Marital Status:    Intimate Partner Violence:     Fear of Current or Ex-Partner:     Emotionally Abused:     Physically Abused:     Sexually Abused:        Current Outpatient Medications   Medication Sig Dispense Refill    ibuprofen (ADVIL;MOTRIN) 800 MG tablet Take 1 tablet by mouth 2 times daily as needed for Pain 180 tablet 1    Collagen Hydrolysate POWD by Does not apply route      levothyroxine (SYNTHROID) 137 MCG tablet Take 1 tablet by mouth Daily 30 tablet 3    norethindrone-ethinyl estradiol (JUNEL FE 1/20) 1-20 MG-MCG per tablet Take 1 tablet by mouth daily 30 tablet 11    ferrous sulfate (IRON 325) 325 (65 Fe) MG tablet Take 1 tablet by mouth daily (with breakfast) 90 tablet 0    cyanocobalamin ([employer] VITAMIN B12) 1000 MCG tablet Take 1 tablet by mouth daily 30 tablet 1    vitamin D3 (CHOLECALCIFEROL) 25 MCG (1000 UT) TABS tablet Take 2 tablets by mouth daily 90 tablet 1     No current facility-administered medications for this metatarsal fracture    Plan: X-rays reviewed with the patient. We recommend treating this fracture conservatively with a walking boot and toe-touch weightbearing with crutches. Would like to see the patient back in 2 weeks for repeat clinical exam and x-rays. Wong Villa is in agreement with this plan. All questions were answered to patient's satisfaction and was encouraged to call with any further questions. Total time spent regarding diagnosis, patient education and development of treatment plan: 30 minutes    [address] [facility] sports medicine with extender      No orders of the defined types were placed in this encounter. I attest that I met personally with the patient, performed the described exam, reviewed the radiographic studies and medical records associated with this patient and supervised the services that are described above.      Alyson Graff MD

## 2021-06-16 NOTE — TELEPHONE ENCOUNTER
6/16/21 [facility] [place]   -  NO PRECERT REQUIRED - PER ONLINE [facility] -  MP

## 2021-07-02 ENCOUNTER — OFFICE VISIT (OUTPATIENT)
Dept: ORTHOPEDIC SURGERY | Age: 26
End: 2021-07-02
Payer: COMMERCIAL

## 2021-07-02 VITALS — HEIGHT: 63 IN | BODY MASS INDEX: 29.59 KG/M2 | WEIGHT: 167 LBS

## 2021-07-02 DIAGNOSIS — S92.352A CLOSED FRACTURE OF BASE OF FIFTH METATARSAL BONE OF LEFT FOOT, INITIAL ENCOUNTER: Primary | ICD-10-CM

## 2021-07-02 DIAGNOSIS — M79.672 LEFT FOOT PAIN: ICD-10-CM

## 2021-07-02 PROCEDURE — G8417 CALC BMI ABV UP PARAM F/U: HCPCS | Performed by: ORTHOPAEDIC SURGERY

## 2021-07-02 PROCEDURE — G8427 DOCREV CUR MEDS BY ELIG CLIN: HCPCS | Performed by: ORTHOPAEDIC SURGERY

## 2021-07-02 PROCEDURE — 1036F TOBACCO NON-USER: CPT | Performed by: ORTHOPAEDIC SURGERY

## 2021-07-02 PROCEDURE — 99214 OFFICE O/P EST MOD 30 MIN: CPT | Performed by: ORTHOPAEDIC SURGERY

## 2021-07-02 NOTE — LETTER
[facility] [address] [place] [address]  Phone: [phone]  Fax: [phone]    Shireen Casanova MD        July 2, 2021     Patient: Tea Bruner   YOB: 1995   Date of Visit: 7/2/2021       To Whom It May Concern:     Tea Bruner is currently being treated for a fracture and should refrain from any physical activity until further evaluation. If you have any questions or concerns, please don't hesitate to call.     Sincerely,        Shireen Casanova MD

## 2021-07-02 NOTE — PROGRESS NOTES
Follow-up (left foot. )      History of Present Illness:  Cheng Vera is a 30 y.o. female follow-up regarding her left foot. She suffered a base of the fifth metatarsal fracture 2 weeks ago. Patient states that she is doing much better now. She is still compliant with her brace and crutches. States that she is having minimal difficulty ambulating with a a boot. She is taking Motrin which helps. Medical History:  Patient's medications, allergies, past medical, surgical, social and family histories were reviewed and updated as appropriate. Pertinent items are noted in HPI  Review of systems reviewed from Patient History Form completed today and available in the patient's chart under the Media tab. Vital Signs: There were no vitals filed for this visit. Left ankle examination:    Gait: Use of a boot and crutches    Inspection/skin: Fifth metatarsal ecchymosis    Palpation: Mild tenderness palpation over base of fifth metatarsal    Range of Motion: Limited motion, but nonpainful    Strength: Deferred    Effusion: Mild swelling over base fifth metatarsal    Neurologic and vascular: The skin is warm and well perfused throughout. Sensation intact to light touch      Radiology:       Pertinent imaging was interpreted and reviewed with the patient today, images only - no report available. left ankle x-ray:    AP, lateral and mortise views were obtained  and reviewed of the left ankle. Impression: Stable base of the fifth metatarsal fracture with no increased angulation, no increased lucency, no malunion      Assessment :  30 y.o. female with base of the fifth metatarsal fracture, occurred 2 weeks ago    Impression:  Encounter Diagnoses   Name Primary?     Closed fracture of base of fifth metatarsal bone of left foot, initial encounter Yes    Left foot pain        Office Procedures:  Orders Placed This Encounter   Procedures    XR FOOT LEFT (MIN 3 VIEWS)     Standing Status:   Future Number of Occurrences:   1     Standing Expiration Date:   7/2/2022     Order Specific Question:   Reason for exam:     Answer:   foot pain           Plan: Pertinent imaging was reviewed. The etiology, natural history, and treatment options for the disorder were discussed. The roles of activity medication, antiinflammatories, injections, bracing, physical therapy, and surgical interventions were all described to the patient and questions were answered. Patient is doing very well today. Her pain is improved significantly. She is still using her boot but I believe she can discontinue her crutches. I will see her back in 2 weeks at which point we will obtain new x-rays. Will put her in a shoe with a carbon fiber footplate and discontinue the boot    Rey Citizen is in agreement with this plan. All questions were answered to patient's satisfaction and was encouraged to call with any further questions. Total time spent for evaluation, education, and development of treatment plan: 35 minutes    Stiven Muñoz, [address] [facility]  7/3/2021    During this exam, I, Stiven Muñoz PA-C, functioned as a scribe for Dr. Rob Lundy. The history taking and physical examination were performed by Dr. Rob Lundy. All counseling during the appointment was performed between the patient and Dr. Rob Lundy. 7/3/2021 9:07 AM    This dictation was performed with a verbal recognition program (DRAGON) and it was checked for errors. It is possible that there are still dictated errors within this office note. If so, please bring any areas to my attention for an addendum. All efforts were made to ensure that this office note is accurate. I attest that I met personally with the patient, performed the described exam, reviewed the radiographic studies and medical records associated with this patient and supervised the services that are described above.      Dani Rodarte MD

## 2021-07-20 ENCOUNTER — OFFICE VISIT (OUTPATIENT)
Dept: ORTHOPEDIC SURGERY | Age: 26
End: 2021-07-20
Payer: COMMERCIAL

## 2021-07-20 VITALS — WEIGHT: 170 LBS | HEIGHT: 63 IN | TEMPERATURE: 97.6 F | BODY MASS INDEX: 30.12 KG/M2

## 2021-07-20 DIAGNOSIS — M79.672 LEFT FOOT PAIN: Primary | ICD-10-CM

## 2021-07-20 PROCEDURE — L3040 FT ARCH SUPRT PREMOLD LONGIT: HCPCS | Performed by: PHYSICIAN ASSISTANT

## 2021-07-20 PROCEDURE — 99214 OFFICE O/P EST MOD 30 MIN: CPT | Performed by: PHYSICIAN ASSISTANT

## 2021-07-20 PROCEDURE — G8417 CALC BMI ABV UP PARAM F/U: HCPCS | Performed by: PHYSICIAN ASSISTANT

## 2021-07-20 PROCEDURE — 1036F TOBACCO NON-USER: CPT | Performed by: PHYSICIAN ASSISTANT

## 2021-07-20 PROCEDURE — G8427 DOCREV CUR MEDS BY ELIG CLIN: HCPCS | Performed by: PHYSICIAN ASSISTANT

## 2021-07-20 NOTE — PROGRESS NOTES
Follow-up (left foot.)      History of Present Illness:  Sharon Zurita is a 30 y.o. female here for follow-up regarding his left foot. As a reminder she suffered a base of the fifth metatarsal fracture 4 weeks ago. Patient is doing very well today. She has been compliant in a boot. Ambulating with no pain. Motrin occasionally. Medical History:  Patient's medications, allergies, past medical, surgical, social and family histories were reviewed and updated as appropriate. Pertinent items are noted in HPI  Review of systems reviewed from Patient History Form completed today and available in the patient's chart under the Media tab. Vital Signs:  Vitals:    07/20/21 1310   Temp: 97.6 °F (36.4 °C)       Left foot examination:    Inspection: There is normal alignment. No swelling or ecchymosis is present. Gait: Patient is able to weight-bear without pain. Range of motion: Patient can perform a toe rise without pain. There is full range of motion of the ankle, midfoot, hindfoot and forefoot. Stability: No instability is present. Strength: Normal strength is present. Palpation: Mild tenderness palpation over base of fifth metatarsal.      Neurovascular: Skin is warm and well-perfused. Sensation normal.    Right foot comparison exam:    Inspection: There is normal alignment. No swelling or ecchymosis is present. Gait: Patient is able to weight-bear without pain. Range of motion: Patient can perform a toe rise without pain. There is full range of motion of the ankle, midfoot, hindfoot and forefoot. Stability: No instability is present. Strength: Normal strength is present. Palpation: There is no focal tenderness. Neurovascular: Skin is warm and well-perfused. Sensation normal.      Radiology:       Pertinent imaging was interpreted and reviewed with the patient today, images only - no report available.     Left foot x-ray:    AP, Oblique, Lateral views were obtained  and reviewed of the left foot. Impression: Stable base fifth metatarsal fracture with no increased angulation, no malunion, no increased lucency        Assessment :  30 y.o. female with base fifth metatarsal fracture, occurred 4 weeks ago    Impression:  Encounter Diagnosis   Name Primary?  Left foot pain Yes       Office Procedures:  Orders Placed This Encounter   Procedures    XR FOOT LEFT (MIN 3 VIEWS)     Standing Status:   Future     Number of Occurrences:   1     Standing Expiration Date:   7/20/2022     Order Specific Question:   Reason for exam:     Answer:   foot pain    Powerstep Protech Full Length Insert     Patient was prescribed Powerstep Protech Full Length Inserts. The bilateral feet will require stabilization / support from this semi-rigid / rigid orthosis to improve their function. The orthosis will assist in protecting the affected area, provide functional support and facilitate healing. The patient was educated and fit by a healthcare professional with expert knowledge and specialization in brace application while under the direct supervision of the treating physician. Verbal and written instructions for the use of and application of this item were provided. They were instructed to contact the office immediately should the brace result in increased pain, decreased sensation, increased swelling or worsening of the condition. Plan: Pertinent imaging was reviewed. The etiology, natural history, and treatment options for the disorder were discussed. The roles of activity medication, antiinflammatories, injections, bracing, physical therapy, and surgical interventions were all described to the patient and questions were answered. Patient is doing very well today. This time I will discontinue her boot and switch her to a carbon footplate. I will see her back in 2 weeks with repeat x-rays or sooner if worsening symptoms.     Tea Bruner is in agreement with this plan. All questions were answered to patient's satisfaction and was encouraged to call with any further questions. Total time spent for evaluation, education, and development of treatment plan: 35 minutes    Sheralyn Runner, [address]  7/20/2021    This dictation was performed with a verbal recognition program [facility]) and it was checked for errors. It is possible that there are still dictated errors within this office note. If so, please bring any areas to my attention for an addendum. All efforts were made to ensure that this office note is accurate.

## 2021-07-29 ENCOUNTER — OFFICE VISIT (OUTPATIENT)
Dept: ENDOCRINOLOGY | Age: 26
End: 2021-07-29
Payer: COMMERCIAL

## 2021-07-29 VITALS
DIASTOLIC BLOOD PRESSURE: 70 MMHG | WEIGHT: 170 LBS | HEART RATE: 69 BPM | HEIGHT: 63 IN | SYSTOLIC BLOOD PRESSURE: 110 MMHG | TEMPERATURE: 98 F | BODY MASS INDEX: 30.12 KG/M2 | RESPIRATION RATE: 14 BRPM | OXYGEN SATURATION: 99 %

## 2021-07-29 DIAGNOSIS — E55.9 VITAMIN D DEFICIENCY: ICD-10-CM

## 2021-07-29 DIAGNOSIS — D64.9 ANEMIA, UNSPECIFIED TYPE: ICD-10-CM

## 2021-07-29 DIAGNOSIS — E04.2 MULTINODULAR GOITER: ICD-10-CM

## 2021-07-29 DIAGNOSIS — E03.9 ACQUIRED HYPOTHYROIDISM: Primary | ICD-10-CM

## 2021-07-29 DIAGNOSIS — R68.82 LOW LIBIDO: ICD-10-CM

## 2021-07-29 DIAGNOSIS — E53.8 B12 DEFICIENCY: ICD-10-CM

## 2021-07-29 DIAGNOSIS — E66.9 CLASS 1 OBESITY WITH BODY MASS INDEX (BMI) OF 30.0 TO 30.9 IN ADULT, UNSPECIFIED OBESITY TYPE, UNSPECIFIED WHETHER SERIOUS COMORBIDITY PRESENT: ICD-10-CM

## 2021-07-29 DIAGNOSIS — E06.3 HASHIMOTO'S THYROIDITIS: ICD-10-CM

## 2021-07-29 DIAGNOSIS — N91.5 OLIGOMENORRHEA, UNSPECIFIED TYPE: ICD-10-CM

## 2021-07-29 PROCEDURE — G8427 DOCREV CUR MEDS BY ELIG CLIN: HCPCS | Performed by: INTERNAL MEDICINE

## 2021-07-29 PROCEDURE — 99214 OFFICE O/P EST MOD 30 MIN: CPT | Performed by: INTERNAL MEDICINE

## 2021-07-29 PROCEDURE — 1036F TOBACCO NON-USER: CPT | Performed by: INTERNAL MEDICINE

## 2021-07-29 PROCEDURE — G8417 CALC BMI ABV UP PARAM F/U: HCPCS | Performed by: INTERNAL MEDICINE

## 2021-07-29 RX ORDER — PHENTERMINE HYDROCHLORIDE 37.5 MG/1
37.5 TABLET ORAL
Qty: 30 TABLET | Refills: 0 | Status: SHIPPED | OUTPATIENT
Start: 2021-07-29 | End: 2021-09-16

## 2021-07-29 NOTE — PROGRESS NOTES
SUBJECTIVE:  Pretty Partida is a 30 y.o. female who is being evaluated for hypothyroidism. 1. Acquired hypothyroidism    This started in 2019. Patient was diagnosed with hypothyroidism. The problem has been gradually worsening. Previous thyroid studies include: TSH and free thyroxine. Patient started medication in 2019. Currently patient is on: levothyroxine. Misses  0doses a month. Current complaints: fatigue, irritability, mood swings, hair loss  Has fatigue, moderate in intensity  Fatigue worse in the afternoon  Has sleepiness    2. Hashimoto's thyroiditis  Complains of fatigue, weight gain  Positive TPO antibodies    3. Oligomenorrhea, unspecified type  Irregular periods without BCP and with BCP, usually skips 1 month  Started periods at 13 yo. Periods used to be heavy, not as heavy at this time. 4. Obesity  Gained weight 20 pounds over 2 years despite diet and exercise  Has , but did not lost weight  Celiac panel negative  On [facility] weight management program, lost some weight  Eats healthy, limits carbs  Now has foot problem    5. Multinodular goiter  History of obstructive symptoms: difficulty swallowing No, changes in voice/hoarseness No.  History of radiation to patient's neck: No  Resent iodine exposure: No  Family history includes hypothyroidism  Family history of thyroid cancer: No    6. Low libido  Patient complains of low libido and is concerned that its abnormal at her age    9. Anemia, unspecified type  Has fatigue    8. B12 deficiency  No numbness    9.   Vitamin D deficiency   Has fatigue    THYROID ULTRASOUND   History : Goiter       COMMENTS :    Thyroid size : Right lobe 4.0 x 1.2 x 1.4 cm                                     Left lobe 3.6 x 1.2 x 1.4 cm   Echotexture : Heterogeneous   Nodules :    16 x 12 x 9 mm heterogeneously hypoechoic nodule in the right inferior lobe - ACR TR 3   7 x 4 x 6 mm hyperechoic nodule in the left inferior lobe - ACR TR 3                 Impression   IMPRESSION :       Six-month follow-up ultrasound to document stability of hypoechoic nodule in the right lobe.       Heterogeneous echotexture noted. This can be seen with thyroiditis. History reviewed. No pertinent past medical history. Patient Active Problem List    Diagnosis Date Noted    Multinodular goiter 07/28/2021    Vitamin D deficiency 01/07/2021    Hashimoto's thyroiditis 12/29/2020    Acquired hypothyroidism 12/29/2020    Anemia 12/29/2020    B12 deficiency 12/29/2020    Class 1 obesity with body mass index (BMI) of 30.0 to 30.9 in adult 12/29/2020    Oligomenorrhea 12/29/2020    Low libido 12/29/2020     History reviewed. No pertinent surgical history. Family History   Problem Relation Age of Onset    Hypothyroidism Mother     Bipolar Disorder Mother      Social History     Socioeconomic History    Marital status: Single     Spouse name: None    Number of children: None    Years of education: None    Highest education level: None   Occupational History    None   Tobacco Use    Smoking status: Never Smoker    Smokeless tobacco: Never Used   Vaping Use    Vaping Use: Never used   Substance and Sexual Activity    Alcohol use: Yes     Comment: 2-3 per week    Drug use: Never    Sexual activity: None   Other Topics Concern    None   Social History Narrative    None     Social Determinants of Health     Financial Resource Strain: Low Risk     Difficulty of Paying Living Expenses: Not hard at all   Food Insecurity: No Food Insecurity    Worried About Running Out of Food in the Last Year: Never true    Edgard of Food in the Last Year: Never true   Transportation Needs: No Transportation Needs    Lack of Transportation (Medical): No    Lack of Transportation (Non-Medical):  No   Physical Activity:     Days of Exercise per Week:     Minutes of Exercise per Session:    Stress:     Feeling of Stress :    Social Connections:     Frequency of Communication with Friends and Family:     Frequency of Social Gatherings with Friends and Family:     Attends Spiritism Services:     Active Member of Clubs or Organizations:     Attends Club or Organization Meetings:     Marital Status:    Intimate Partner Violence:     Fear of Current or Ex-Partner:     Emotionally Abused:     Physically Abused:     Sexually Abused:      Current Outpatient Medications   Medication Sig Dispense Refill    Collagen Hydrolysate POWD by Does not apply route      levothyroxine (SYNTHROID) 137 MCG tablet Take 1 tablet by mouth Daily 30 tablet 3    norethindrone-ethinyl estradiol (JUNEL FE 1/20) 1-20 MG-MCG per tablet Take 1 tablet by mouth daily 30 tablet 11    ferrous sulfate (IRON 325) 325 (65 Fe) MG tablet Take 1 tablet by mouth daily (with breakfast) 90 tablet 0    cyanocobalamin ([employer] VITAMIN B12) 1000 MCG tablet Take 1 tablet by mouth daily 30 tablet 1    vitamin D3 (CHOLECALCIFEROL) 25 MCG (1000 UT) TABS tablet Take 2 tablets by mouth daily 90 tablet 1    ibuprofen (ADVIL;MOTRIN) 800 MG tablet Take 1 tablet by mouth 2 times daily as needed for Pain (Patient not taking: Reported on 7/29/2021) 180 tablet 1     No current facility-administered medications for this visit.      No Known Allergies  Family Status   Relation Name Status    Mother  (Not Specified)       Review of Systems:  Constitutional: has fatigue, no fever, has recent weight gain, no recent weight loss, no changes in appetite  Eyes: no eye pain, no change in vision, no eye redness, no eye irritation, no double vision  Ears, nose, throat: no nasal congestion, no sore throat, no earache, no decrease in hearing, no hoarseness, no dry mouth, no sinus problems, no difficulty swallowing, no neck lumps, no dental problems, no mouth sores, no ringing in ears  Pulmonary: no shortness of breath, no wheezing, no dyspnea on exertion, no cough  Cardiovascular: no chest pain, no lower extremity edema, no orthopnea, no intermittent leg claudication, no palpitations  Gastrointestinal: no abdominal pain, no nausea, no vomiting, no diarrhea, no constipation, no dysphagia, no heartburn, no bloating  Genitourinary: no dysuria, no urinary incontinence, no urinary hesitancy, no urinary frequency, no feelings of urinary urgency, no nocturia  Musculoskeletal: no joint swelling, no joint stiffness, no joint pain, no muscle cramps, no muscle pain, no bone pain  Integument/Breast: has hair loss, no skin rashes, no skin lesions, no itching, no dry skin  Neurological: no numbness, no tingling, no weakness, no confusion, has headaches, no dizziness, no fainting, no tremors, no decrease in memory, no balance problems  Psychiatric: has anxiety, has depression, no insomnia  Hematologic/Lymphatic: no tendency for easy bleeding, no swollen lymph nodes, no tendency for easy bruising  Immunology: no seasonal allergies, no frequent infections, no frequent illnesses  Endocrine: has temperature intolerance    /70   Pulse 69   Temp 98 °F (36.7 °C)   Resp 14   Ht 5' 3\" (1.6 m)   Wt 170 lb (77.1 kg)   SpO2 99%   BMI 30.11 kg/m²    Wt Readings from Last 3 Encounters:   07/29/21 170 lb (77.1 kg)   07/20/21 170 lb (77.1 kg)   07/02/21 167 lb (75.8 kg)     Body mass index is 30.11 kg/m².     OBJECTIVE:  Constitutional: no acute distress, well appearing and well nourished  Psychiatric: oriented to person, place and time, judgement and insight and normal, recent and remote memory and intact and mood and affect are normal  Skin: skin and subcutaneous tissue is normal without mass, normal turgor  Head and Face: examination of head and face revealed no abnormalities  Eyes: no lid or conjunctival swelling, erythema or discharge, pupils are normal, equal, round, reactive to light  Ears/Nose: external inspection of ears and nose revealed no abnormalities, hearing is grossly normal  Oropharynx/Mouth/Face: lips, tongue and gums are normal with no lesions, the voice quality was normal  Neck: neck is supple and symmetric, with midline trachea and no masses, thyroid is pebbly  Lymphatics: normal cervical lymph nodes, normal supraclavicular nodes  Pulmonary: no increased work of breathing or signs of respiratory distress, lungs are clear to auscultation  Cardiovascular: normal heart rate and rhythm, normal S1 and S2, no murmurs and pedal pulses and 2+ bilaterally, No edema  Abdomen: abdomen is soft, non-tender with no masses  Musculoskeletal: normal gait and station and exam of the digits and nails are normal  Neurological: normal coordination and normal general cortical function      Lab Review:    Lab Results   Component Value Date    WBC 4.7 12/29/2020    HGB 12.9 12/29/2020    HCT 39.5 12/29/2020    MCV 82.9 12/29/2020     12/29/2020     Lab Results   Component Value Date     07/28/2021    K 4.1 07/28/2021     07/28/2021    CO2 24 07/28/2021    BUN 12 07/28/2021    CREATININE 0.8 07/28/2021    GLUCOSE 72 07/28/2021    CALCIUM 9.4 07/28/2021    PROT 7.8 07/28/2021    LABALBU 4.4 07/28/2021    BILITOT 0.3 07/28/2021    ALKPHOS 27 07/28/2021    AST 23 07/28/2021    ALT 17 07/28/2021    LABGLOM >60 07/28/2021    GFRAA >60 07/28/2021    AGRATIO 1.3 07/28/2021    GLOB 3.4 07/28/2021     Lab Results   Component Value Date    TSH 2.90 07/28/2021    FT3 2.8 07/28/2021     No results found for: LABA1C  No results found for: EAG  No results found for: CHOL  No results found for: TRIG  No results found for: HDL  No results found for: LDLCHOLESTEROL, LDLCALC  No results found for: LABVLDL, VLDL  No results found for: CHOLHDLRATIO  No results found for: Miguel Chiu  Lab Results   Component Value Date    VITD25 40.2 07/28/2021        ASSESSMENT/PLAN:    1. Acquired hypothyroidism    TSH 34.14-3.6210.482.9  Continue levothyroxine 0.137 mg daily  In one month increase if indicated  Counseled patient about different thyroid medications, their side effects and benefits.   - T3, Free; Future  - T4, Free; Future  - TSH without Reflex; Future    2. Hashimoto's thyroiditis  - Thyroid Peroxidase Antibody; Future  - Anti-Thyroglobulin Antibody; Future    3. Anemia, unspecified type  Ferritin 9.835.7  - Vitamin B12 & Folate; Future  - CBC; Future  - Iron and TIBC; Future  - Ferritin; Future  - Vitamin D 25 Hydroxy; Future    4. B12 deficiency  B12 173231  Will see PCP  - Vitamin B12 & Folate; Future    5. Multinodular goiter  FNA of the right 1.6 cm nodule  1/2021 thyroid ultrasound right 1.6 cm, left 0.7 cm nodules  - Thyroid Peroxidase Antibody; Future  - Anti-Thyroglobulin Antibody; Future  - US HEAD NECK SOFT TISSUE THYROID; Future    6. Obesity  - Comprehensive Metabolic Panel; Future  Diet and exercise  Start Phentermine     7. Oligomenorrhea, unspecified type  Follow    8. Low libido  - DHEA-Sulfate; Future  - Testosterone, free, total; Future    9.  Vitamin D deficiency  25OHvitamin D 24.440.2  Has fatigue  Vitamin D 2000 international units daily  New problem    Reviewed and/or ordered clinical lab results Yes  Reviewed and/or ordered radiology tests Yes   Reviewed and/or ordered other diagnostic tests No  Discussed test results with performing physician No  Independently reviewed image, tracing, or specimen No  Made a decision to obtain old records No  Reviewed and summarized old records No   TSH 26.7-8.2-2.8  Hemoglobin A1c 5.4  B12 96  TPO antibody 734.9  Thyroglobulin antibody 1.9  Celiac panel negative  Hb 11.2  Obtained history from other than patient No    Elidia Sanchez was counseled regarding symptoms of thyroid, oligomenorrhea, Hashimoto's thyroiditis, low libido diagnosis, course and complications of disease if inadequately treated, side effects of medications, diagnosis, treatment options, and prognosis, risks, benefits, complications, and alternatives of treatment, labs, imaging and other studies and treatment targets and goals, different thyroid medications, side effects and benefits. She understands instructions and counseling. Return in about 1 month (around 8/29/2021) for weight management.     Electronically signed by Lizandro Paulino MD on 7/29/2021 at 12:37 PM

## 2021-09-16 ENCOUNTER — OFFICE VISIT (OUTPATIENT)
Dept: ENDOCRINOLOGY | Age: 26
End: 2021-09-16
Payer: COMMERCIAL

## 2021-09-16 VITALS
WEIGHT: 165.6 LBS | HEART RATE: 62 BPM | SYSTOLIC BLOOD PRESSURE: 118 MMHG | BODY MASS INDEX: 29.34 KG/M2 | TEMPERATURE: 98 F | RESPIRATION RATE: 14 BRPM | HEIGHT: 63 IN | OXYGEN SATURATION: 98 % | DIASTOLIC BLOOD PRESSURE: 64 MMHG

## 2021-09-16 DIAGNOSIS — E04.2 MULTINODULAR GOITER: ICD-10-CM

## 2021-09-16 DIAGNOSIS — R68.82 LOW LIBIDO: ICD-10-CM

## 2021-09-16 DIAGNOSIS — E53.8 B12 DEFICIENCY: ICD-10-CM

## 2021-09-16 DIAGNOSIS — E55.9 VITAMIN D DEFICIENCY: ICD-10-CM

## 2021-09-16 DIAGNOSIS — E06.3 HASHIMOTO'S THYROIDITIS: ICD-10-CM

## 2021-09-16 DIAGNOSIS — D64.9 ANEMIA, UNSPECIFIED TYPE: ICD-10-CM

## 2021-09-16 DIAGNOSIS — E03.9 ACQUIRED HYPOTHYROIDISM: Primary | ICD-10-CM

## 2021-09-16 DIAGNOSIS — N91.5 OLIGOMENORRHEA, UNSPECIFIED TYPE: ICD-10-CM

## 2021-09-16 DIAGNOSIS — E66.9 CLASS 1 OBESITY WITH BODY MASS INDEX (BMI) OF 30.0 TO 30.9 IN ADULT, UNSPECIFIED OBESITY TYPE, UNSPECIFIED WHETHER SERIOUS COMORBIDITY PRESENT: ICD-10-CM

## 2021-09-16 PROCEDURE — 99214 OFFICE O/P EST MOD 30 MIN: CPT | Performed by: INTERNAL MEDICINE

## 2021-09-16 PROCEDURE — 1036F TOBACCO NON-USER: CPT | Performed by: INTERNAL MEDICINE

## 2021-09-16 PROCEDURE — G8417 CALC BMI ABV UP PARAM F/U: HCPCS | Performed by: INTERNAL MEDICINE

## 2021-09-16 PROCEDURE — G8427 DOCREV CUR MEDS BY ELIG CLIN: HCPCS | Performed by: INTERNAL MEDICINE

## 2021-09-16 RX ORDER — PHENTERMINE HYDROCHLORIDE 37.5 MG/1
37.5 TABLET ORAL
Qty: 30 TABLET | Refills: 0 | Status: SHIPPED | OUTPATIENT
Start: 2021-09-16 | End: 2021-10-21

## 2021-09-16 NOTE — PROGRESS NOTES
SUBJECTIVE:  Jayne Curling is a 27 y.o. female who is being evaluated for hypothyroidism. 1. Acquired hypothyroidism    This started in 2019. Patient was diagnosed with hypothyroidism. The problem has been gradually worsening. Previous thyroid studies include: TSH and free thyroxine. Patient started medication in 2019. Currently patient is on: levothyroxine. Misses  0doses a month. Current complaints: fatigue, irritability, mood swings, hair loss  Has fatigue, moderate in intensity  Fatigue worse in the afternoon  Has sleepiness    2. Hashimoto's thyroiditis  Complains of fatigue, weight gain  Positive TPO antibodies    3. Oligomenorrhea, unspecified type  Irregular periods without BCP and with BCP, usually skips 1 month  Started periods at 13 yo. Periods used to be heavy, not as heavy at this time. 4. Obesity  Gained weight 20 pounds over 2 years despite diet and exercise  Has , but did not lost weight  Celiac panel negative  On [facility] weight management program, lost some weight  Eats healthy, limits carbs    5. Multinodular goiter  History of obstructive symptoms: difficulty swallowing No, changes in voice/hoarseness No.  History of radiation to patient's neck: No  Resent iodine exposure: No  Family history includes hypothyroidism  Family history of thyroid cancer: No    6. Low libido  Patient complains of low libido and is concerned that its abnormal at her age    9. Anemia, unspecified type  Has fatigue    8. B12 deficiency  No numbness    9.   Vitamin D deficiency   Has fatigue    THYROID ULTRASOUND   History : Goiter       COMMENTS :    Thyroid size : Right lobe 4.0 x 1.2 x 1.4 cm                                     Left lobe 3.6 x 1.2 x 1.4 cm   Echotexture : Heterogeneous   Nodules :    16 x 12 x 9 mm heterogeneously hypoechoic nodule in the right inferior lobe - ACR TR 3   7 x 4 x 6 mm hyperechoic nodule in the left inferior lobe - ACR TR 3                   Impression IMPRESSION :       Six-month follow-up ultrasound to document stability of hypoechoic nodule in the right lobe.       Heterogeneous echotexture noted. This can be seen with thyroiditis. Past Medical History:   Diagnosis Date    Goiter     Hashimoto's thyroiditis     Hypothyroidism      Patient Active Problem List    Diagnosis Date Noted    Overweight (BMI 25.0-29.9) 09/16/2021    Multinodular goiter 07/28/2021    Vitamin D deficiency 01/07/2021    Hashimoto's thyroiditis 12/29/2020    Acquired hypothyroidism 12/29/2020    Anemia 12/29/2020    B12 deficiency 12/29/2020    Class 1 obesity with body mass index (BMI) of 30.0 to 30.9 in adult 12/29/2020    Oligomenorrhea 12/29/2020    Low libido 12/29/2020     History reviewed. No pertinent surgical history. Family History   Problem Relation Age of Onset    Hypothyroidism Mother     Bipolar Disorder Mother      Social History     Socioeconomic History    Marital status: Single     Spouse name: None    Number of children: None    Years of education: None    Highest education level: None   Occupational History    None   Tobacco Use    Smoking status: Never Smoker    Smokeless tobacco: Never Used   Vaping Use    Vaping Use: Never used   Substance and Sexual Activity    Alcohol use: Yes     Comment: 2-3 per week    Drug use: Never    Sexual activity: None   Other Topics Concern    None   Social History Narrative    None     Social Determinants of Health     Financial Resource Strain: Low Risk     Difficulty of Paying Living Expenses: Not hard at all   Food Insecurity: No Food Insecurity    Worried About Running Out of Food in the Last Year: Never true    Edgard of Food in the Last Year: Never true   Transportation Needs: No Transportation Needs    Lack of Transportation (Medical): No    Lack of Transportation (Non-Medical):  No   Physical Activity:     Days of Exercise per Week:     Minutes of Exercise per Session:    Stress: mouth sores, no ringing in ears  Pulmonary: no shortness of breath, no wheezing, no dyspnea on exertion, no cough  Cardiovascular: no chest pain, no lower extremity edema, no orthopnea, no intermittent leg claudication, no palpitations  Gastrointestinal: no abdominal pain, no nausea, no vomiting, no diarrhea, no constipation, no dysphagia, no heartburn, no bloating  Genitourinary: no dysuria, no urinary incontinence, no urinary hesitancy, no urinary frequency, no feelings of urinary urgency, no nocturia  Musculoskeletal: no joint swelling, no joint stiffness, no joint pain, no muscle cramps, no muscle pain, no bone pain  Integument/Breast: has hair loss, no skin rashes, no skin lesions, no itching, no dry skin  Neurological: no numbness, no tingling, no weakness, no confusion, has headaches, no dizziness, no fainting, no tremors, no decrease in memory, no balance problems  Psychiatric: has anxiety, has depression, no insomnia  Hematologic/Lymphatic: no tendency for easy bleeding, no swollen lymph nodes, no tendency for easy bruising  Immunology: no seasonal allergies, no frequent infections, no frequent illnesses  Endocrine: has temperature intolerance    /64   Pulse 62   Temp 98 °F (36.7 °C)   Resp 14   Ht 5' 3\" (1.6 m)   Wt 165 lb 9.6 oz (75.1 kg)   SpO2 98%   BMI 29.33 kg/m²    Wt Readings from Last 3 Encounters:   09/16/21 165 lb 9.6 oz (75.1 kg)   07/29/21 170 lb (77.1 kg)   07/20/21 170 lb (77.1 kg)     Body mass index is 29.33 kg/m².     OBJECTIVE:  Constitutional: no acute distress, well appearing and well nourished  Psychiatric: oriented to person, place and time, judgement and insight and normal, recent and remote memory and intact and mood and affect are normal  Skin: skin and subcutaneous tissue is normal without mass, normal turgor  Head and Face: examination of head and face revealed no abnormalities  Eyes: no lid or conjunctival swelling, erythema or discharge, pupils are normal, equal, Acquired hypothyroidism    TSH 34.14-3.6210.482.9  Continue levothyroxine 0.137 mg daily  Counseled patient about different thyroid medications, their side effects and benefits.  - T3, Free; Future  - T4, Free; Future  - TSH without Reflex; Future    2. Hashimoto's thyroiditis  - Thyroid Peroxidase Antibody; Future  - Anti-Thyroglobulin Antibody; Future    3. Anemia, unspecified type  Ferritin 9.835.7  - Vitamin B12 & Folate; Future  - CBC; Future  - Iron and TIBC; Future  - Ferritin; Future  - Vitamin D 25 Hydroxy; Future    4. B12 deficiency  B12 173231  Will see PCP  - Vitamin B12 & Folate; Future    5. Multinodular goiter  FNA of the right 1.6 cm nodule  1/2021 thyroid ultrasound right 1.6 cm, left 0.7 cm nodules  - Thyroid Peroxidase Antibody; Future  - Anti-Thyroglobulin Antibody; Future  - US HEAD NECK SOFT TISSUE THYROID; Future    6. Obesity  - Comprehensive Metabolic Panel; Future  Diet and exercise  Continue Phentermine   Lost 9 lbs  NO anxiety, palpitations, insomnia    7. Oligomenorrhea, unspecified type  Follow    8. Low libido  - DHEA-Sulfate; Future  - Testosterone, free, total; Future    9.  Vitamin D deficiency  25OHvitamin D 24.440.2  Has fatigue  Vitamin D 2000 international units daily  New problem    Reviewed and/or ordered clinical lab results Yes  Reviewed and/or ordered radiology tests Yes   Reviewed and/or ordered other diagnostic tests No  Discussed test results with performing physician No  Independently reviewed image, tracing, or specimen No  Made a decision to obtain old records No  Reviewed and summarized old records No   TSH 26.7-8.2-2.8  Hemoglobin A1c 5.4  B12 96  TPO antibody 734.9  Thyroglobulin antibody 1.9  Celiac panel negative  Hb 11.2  Obtained history from other than patient No    Misha Guthrie was counseled regarding symptoms of thyroid, oligomenorrhea, Hashimoto's thyroiditis, low libido diagnosis, course and complications of disease if inadequately treated, side effects of medications, diagnosis, treatment options, and prognosis, risks, benefits, complications, and alternatives of treatment, labs, imaging and other studies and treatment targets and goals, different thyroid medications, side effects and benefits. She understands instructions and counseling. Return in about 1 month (around 10/16/2021) for weight management.     Electronically signed by Kenn Martínez MD on 9/16/2021 at 4:18 PM

## 2021-09-24 DIAGNOSIS — E03.9 ACQUIRED HYPOTHYROIDISM: ICD-10-CM

## 2021-09-24 RX ORDER — LEVOTHYROXINE SODIUM 137 UG/1
TABLET ORAL
Qty: 90 TABLET | Refills: 1 | Status: SHIPPED | OUTPATIENT
Start: 2021-09-24 | End: 2022-03-24

## 2021-09-24 NOTE — TELEPHONE ENCOUNTER
Requested Prescriptions     Pending Prescriptions Disp Refills    levothyroxine (SYNTHROID) 137 MCG tablet [Pharmacy Med Name: LEVOTHYROXINE 137 MCG TABLET] 90 tablet 1     Sig: TAKE 1 TABLET BY MOUTH EVERY DAY     LAST OV 9/16/21  NEXT OV 10/21/21  LAST REFILL 6/24/2021  RECENT LAB 7/28/21

## 2021-10-21 ENCOUNTER — OFFICE VISIT (OUTPATIENT)
Dept: ENDOCRINOLOGY | Age: 26
End: 2021-10-21
Payer: COMMERCIAL

## 2021-10-21 VITALS
SYSTOLIC BLOOD PRESSURE: 128 MMHG | HEIGHT: 63 IN | BODY MASS INDEX: 27.54 KG/M2 | OXYGEN SATURATION: 98 % | HEART RATE: 75 BPM | WEIGHT: 155.4 LBS | TEMPERATURE: 98 F | DIASTOLIC BLOOD PRESSURE: 74 MMHG | RESPIRATION RATE: 14 BRPM

## 2021-10-21 DIAGNOSIS — E04.2 MULTINODULAR GOITER: ICD-10-CM

## 2021-10-21 DIAGNOSIS — E66.9 CLASS 1 OBESITY WITH BODY MASS INDEX (BMI) OF 30.0 TO 30.9 IN ADULT, UNSPECIFIED OBESITY TYPE, UNSPECIFIED WHETHER SERIOUS COMORBIDITY PRESENT: ICD-10-CM

## 2021-10-21 DIAGNOSIS — E03.9 ACQUIRED HYPOTHYROIDISM: Primary | ICD-10-CM

## 2021-10-21 DIAGNOSIS — E06.3 HASHIMOTO'S THYROIDITIS: ICD-10-CM

## 2021-10-21 DIAGNOSIS — E53.8 B12 DEFICIENCY: ICD-10-CM

## 2021-10-21 DIAGNOSIS — E55.9 VITAMIN D DEFICIENCY: ICD-10-CM

## 2021-10-21 DIAGNOSIS — R68.82 LOW LIBIDO: ICD-10-CM

## 2021-10-21 DIAGNOSIS — D64.9 ANEMIA, UNSPECIFIED TYPE: ICD-10-CM

## 2021-10-21 DIAGNOSIS — N91.5 OLIGOMENORRHEA, UNSPECIFIED TYPE: ICD-10-CM

## 2021-10-21 PROCEDURE — 1036F TOBACCO NON-USER: CPT | Performed by: INTERNAL MEDICINE

## 2021-10-21 PROCEDURE — G8427 DOCREV CUR MEDS BY ELIG CLIN: HCPCS | Performed by: INTERNAL MEDICINE

## 2021-10-21 PROCEDURE — G8484 FLU IMMUNIZE NO ADMIN: HCPCS | Performed by: INTERNAL MEDICINE

## 2021-10-21 PROCEDURE — 99214 OFFICE O/P EST MOD 30 MIN: CPT | Performed by: INTERNAL MEDICINE

## 2021-10-21 PROCEDURE — G8417 CALC BMI ABV UP PARAM F/U: HCPCS | Performed by: INTERNAL MEDICINE

## 2021-10-21 RX ORDER — PHENTERMINE HYDROCHLORIDE 37.5 MG/1
37.5 TABLET ORAL
Qty: 30 TABLET | Refills: 0 | Status: SHIPPED | OUTPATIENT
Start: 2021-10-21 | End: 2021-11-20

## 2021-10-21 NOTE — PROGRESS NOTES
SUBJECTIVE:  Héctor Collier is a 27 y.o. female who is being evaluated for hypothyroidism. 1. Acquired hypothyroidism    This started in 2019. Patient was diagnosed with hypothyroidism. The problem has been gradually worsening. Previous thyroid studies include: TSH and free thyroxine. Patient started medication in 2019. Currently patient is on: levothyroxine. Misses  0doses a month. Current complaints: fatigue, irritability, mood swings, hair loss  Has fatigue, moderate in intensity  Fatigue worse in the afternoon  Has sleepiness    2. Hashimoto's thyroiditis  Complains of fatigue, weight gain  Positive TPO antibodies    3. Oligomenorrhea, unspecified type  Irregular periods without BCP and with BCP, usually skips 1 month  Started periods at 13 yo. Periods used to be heavy, not as heavy at this time. 4. Obesity  Gained weight 20 pounds over 2 years despite diet and exercise  Has , but did not lost weight  Celiac panel negative  On [facility] weight management program, lost some weight  Eats healthy, limits carbs    5. Multinodular goiter  History of obstructive symptoms: difficulty swallowing No, changes in voice/hoarseness No.  History of radiation to patient's neck: No  Resent iodine exposure: No  Family history includes hypothyroidism  Family history of thyroid cancer: No    6. Low libido  Patient complains of low libido and is concerned that its abnormal at her age    9. Anemia, unspecified type  Has fatigue    8. B12 deficiency  No numbness    9.   Vitamin D deficiency   Has fatigue    1/8/2021  THYROID ULTRASOUND   History : Goiter       COMMENTS :    Thyroid size : Right lobe 4.0 x 1.2 x 1.4 cm                                     Left lobe 3.6 x 1.2 x 1.4 cm   Echotexture : Heterogeneous   Nodules :    16 x 12 x 9 mm heterogeneously hypoechoic nodule in the right inferior lobe - ACR TR 3   7 x 4 x 6 mm hyperechoic nodule in the left inferior lobe - ACR TR 3                   Impression IMPRESSION :       Six-month follow-up ultrasound to document stability of hypoechoic nodule in the right lobe.       Heterogeneous echotexture noted. This can be seen with thyroiditis. Past Medical History:   Diagnosis Date    Goiter     Hashimoto's thyroiditis     Hypothyroidism      Patient Active Problem List    Diagnosis Date Noted    Overweight (BMI 25.0-29.9) 09/16/2021    Multinodular goiter 07/28/2021    Vitamin D deficiency 01/07/2021    Hashimoto's thyroiditis 12/29/2020    Acquired hypothyroidism 12/29/2020    Anemia 12/29/2020    B12 deficiency 12/29/2020    Class 1 obesity with body mass index (BMI) of 30.0 to 30.9 in adult 12/29/2020    Oligomenorrhea 12/29/2020    Low libido 12/29/2020     History reviewed. No pertinent surgical history. Family History   Problem Relation Age of Onset    Hypothyroidism Mother     Bipolar Disorder Mother      Social History     Socioeconomic History    Marital status: Single     Spouse name: None    Number of children: None    Years of education: None    Highest education level: None   Occupational History    None   Tobacco Use    Smoking status: Never Smoker    Smokeless tobacco: Never Used   Vaping Use    Vaping Use: Never used   Substance and Sexual Activity    Alcohol use: Yes     Comment: 2-3 per week    Drug use: Never    Sexual activity: None   Other Topics Concern    None   Social History Narrative    None     Social Determinants of Health     Financial Resource Strain: Low Risk     Difficulty of Paying Living Expenses: Not hard at all   Food Insecurity: No Food Insecurity    Worried About Running Out of Food in the Last Year: Never true    Edgard of Food in the Last Year: Never true   Transportation Needs: No Transportation Needs    Lack of Transportation (Medical): No    Lack of Transportation (Non-Medical):  No   Physical Activity:     Days of Exercise per Week:     Minutes of Exercise per Session:    Stress:  Feeling of Stress :    Social Connections:     Frequency of Communication with Friends and Family:     Frequency of Social Gatherings with Friends and Family:     Attends Restoration Services:     Active Member of Clubs or Organizations:     Attends Club or Organization Meetings:     Marital Status:    Intimate Partner Violence:     Fear of Current or Ex-Partner:     Emotionally Abused:     Physically Abused:     Sexually Abused:      Current Outpatient Medications   Medication Sig Dispense Refill    phentermine (ADIPEX-P) 37.5 MG tablet Take 1 tablet by mouth every morning (before breakfast) for 30 days. 30 tablet 0    levothyroxine (SYNTHROID) 137 MCG tablet TAKE 1 TABLET BY MOUTH EVERY DAY 90 tablet 1    norethindrone-ethinyl estradiol (JUNEL FE 1/20) 1-20 MG-MCG per tablet Take 1 tablet by mouth daily 30 tablet 11    ferrous sulfate (IRON 325) 325 (65 Fe) MG tablet Take 1 tablet by mouth daily (with breakfast) 90 tablet 0    cyanocobalamin ([employer] VITAMIN B12) 1000 MCG tablet Take 1 tablet by mouth daily 30 tablet 1    vitamin D3 (CHOLECALCIFEROL) 25 MCG (1000 UT) TABS tablet Take 2 tablets by mouth daily 90 tablet 1    ibuprofen (ADVIL;MOTRIN) 800 MG tablet Take 1 tablet by mouth 2 times daily as needed for Pain (Patient not taking: Reported on 9/16/2021) 180 tablet 1    Collagen Hydrolysate POWD by Does not apply route (Patient not taking: Reported on 10/21/2021)       No current facility-administered medications for this visit.      No Known Allergies  Family Status   Relation Name Status    Mother  (Not Specified)       Review of Systems:  Constitutional: has fatigue, no fever, has recent weight gain, no recent weight loss, no changes in appetite  Eyes: no eye pain, no change in vision, no eye redness, no eye irritation, no double vision  Ears, nose, throat: no nasal congestion, no sore throat, no earache, no decrease in hearing, no hoarseness, no dry mouth, no sinus problems, no difficulty swallowing, no neck lumps, no dental problems, no mouth sores, no ringing in ears  Pulmonary: no shortness of breath, no wheezing, no dyspnea on exertion, no cough  Cardiovascular: no chest pain, no lower extremity edema, no orthopnea, no intermittent leg claudication, no palpitations  Gastrointestinal: no abdominal pain, no nausea, no vomiting, no diarrhea, no constipation, no dysphagia, no heartburn, no bloating  Genitourinary: no dysuria, no urinary incontinence, no urinary hesitancy, no urinary frequency, no feelings of urinary urgency, no nocturia  Musculoskeletal: no joint swelling, no joint stiffness, no joint pain, no muscle cramps, no muscle pain, no bone pain  Integument/Breast: has hair loss, no skin rashes, no skin lesions, no itching, no dry skin  Neurological: no numbness, no tingling, no weakness, no confusion, has headaches, no dizziness, no fainting, no tremors, no decrease in memory, no balance problems  Psychiatric: has anxiety, has depression, no insomnia  Hematologic/Lymphatic: no tendency for easy bleeding, no swollen lymph nodes, no tendency for easy bruising  Immunology: no seasonal allergies, no frequent infections, no frequent illnesses  Endocrine: has temperature intolerance    /74   Pulse 75   Temp 98 °F (36.7 °C)   Resp 14   Ht 5' 3\" (1.6 m)   Wt 155 lb 6.4 oz (70.5 kg)   SpO2 98%   BMI 27.53 kg/m²    Wt Readings from Last 3 Encounters:   10/21/21 155 lb 6.4 oz (70.5 kg)   09/16/21 165 lb 9.6 oz (75.1 kg)   07/29/21 170 lb (77.1 kg)     Body mass index is 27.53 kg/m².     OBJECTIVE:  Constitutional: no acute distress, well appearing and well nourished  Psychiatric: oriented to person, place and time, judgement and insight and normal, recent and remote memory and intact and mood and affect are normal  Skin: skin and subcutaneous tissue is normal without mass, normal turgor  Head and Face: examination of head and face revealed no abnormalities  Eyes: no lid or conjunctival swelling, erythema or discharge, pupils are normal, equal, round, reactive to light  Ears/Nose: external inspection of ears and nose revealed no abnormalities, hearing is grossly normal  Oropharynx/Mouth/Face: lips, tongue and gums are normal with no lesions, the voice quality was normal  Neck: neck is supple and symmetric, with midline trachea and no masses, thyroid is pebbly  Lymphatics: normal cervical lymph nodes, normal supraclavicular nodes  Pulmonary: no increased work of breathing or signs of respiratory distress, lungs are clear to auscultation  Cardiovascular: normal heart rate and rhythm, normal S1 and S2, no murmurs and pedal pulses and 2+ bilaterally, No edema  Abdomen: abdomen is soft, non-tender with no masses  Musculoskeletal: normal gait and station and exam of the digits and nails are normal  Neurological: normal coordination and normal general cortical function      Lab Review:    Lab Results   Component Value Date    WBC 4.7 12/29/2020    HGB 12.9 12/29/2020    HCT 39.5 12/29/2020    MCV 82.9 12/29/2020     12/29/2020     Lab Results   Component Value Date     10/19/2021    K 4.2 10/19/2021     10/19/2021    CO2 22 10/19/2021    BUN 7 10/19/2021    CREATININE 0.9 10/19/2021    GLUCOSE 128 10/19/2021    CALCIUM 9.7 10/19/2021    PROT 7.4 10/19/2021    LABALBU 4.4 10/19/2021    BILITOT 0.3 10/19/2021    ALKPHOS 29 10/19/2021    AST 26 10/19/2021    ALT 24 10/19/2021    LABGLOM >60 10/19/2021    GFRAA >60 10/19/2021    AGRATIO 1.5 10/19/2021    GLOB 3.0 10/19/2021     Lab Results   Component Value Date    TSH 0.35 10/19/2021    FT3 3.4 10/19/2021     No results found for: LABA1C  No results found for: EAG  No results found for: CHOL  No results found for: TRIG  No results found for: HDL  No results found for: LDLCHOLESTEROL, LDLCALC  No results found for: LABVLDL, VLDL  No results found for: CHOLHDLRATIO  No results found for: LABMICR, [id number]  Lab Results   Component Value Date VITD25 33.0 10/19/2021        ASSESSMENT/PLAN:    1. Acquired hypothyroidism  TSH 34.14-3.6210.482.9 -0.35  Continue levothyroxine 0.137 mg daily  Counseled patient about different thyroid medications, their side effects and benefits.  - T3, Free; Future  - T4, Free; Future  - TSH without Reflex; Future    2. Hashimoto's thyroiditis  - Thyroid Peroxidase Antibody; Future  - Anti-Thyroglobulin Antibody; Future    3. Anemia, unspecified type  Ferritin 9.835.7  - Vitamin B12 & Folate; Future  - CBC; Future  - Iron and TIBC; Future  - Ferritin; Future  - Vitamin D 25 Hydroxy; Future    4. B12 deficiency  B12 173231  Will see PCP  - Vitamin B12 & Folate; Future    5. Multinodular goiter  TSH 0.35  FNA of the right 1.6 cm nodule  1/2021 thyroid ultrasound right 1.6 cm, left 0.7 cm nodules  - US HEAD NECK SOFT TISSUE THYROID; Future    6. Obesity  - Comprehensive Metabolic Panel; Future  Diet and exercise  Continue Phentermine   Lost 18 lbs  No anxiety, palpitations, insomnia    7. Oligomenorrhea, unspecified type  Follow    8. Low libido  - DHEA-Sulfate; Future  - Testosterone, free, total; Future    9.  Vitamin D deficiency  25OHvitamin D 24.440.233.0  Has fatigue  Vitamin D 2000 international units daily  New problem    Reviewed and/or ordered clinical lab results Yes  Reviewed and/or ordered radiology tests Yes   Reviewed and/or ordered other diagnostic tests No  Discussed test results with performing physician No  Independently reviewed image, tracing, or specimen No  Made a decision to obtain old records No  Reviewed and summarized old records No   TSH 26.7-8.2-2.8  Hemoglobin A1c 5.4  B12 96  TPO antibody 734.9  Thyroglobulin antibody 1.9  Celiac panel negative  Hb 11.2  Obtained history from other than patient No    Masood Long was counseled regarding symptoms of thyroid, oligomenorrhea, Hashimoto's thyroiditis, low libido diagnosis, course and complications of disease if inadequately treated, side

## 2021-12-07 DIAGNOSIS — S99.922A INJURY OF LEFT FOOT, INITIAL ENCOUNTER: ICD-10-CM

## 2021-12-07 RX ORDER — IBUPROFEN 800 MG/1
800 TABLET ORAL 2 TIMES DAILY PRN
Qty: 180 TABLET | Refills: 1 | Status: SHIPPED | OUTPATIENT
Start: 2021-12-07

## 2021-12-07 NOTE — TELEPHONE ENCOUNTER
Medication:   Requested Prescriptions     Pending Prescriptions Disp Refills    ibuprofen (ADVIL;MOTRIN) 800 MG tablet [Pharmacy Med Name: IBUPROFEN 800 MG TABLET] 180 tablet 1     Sig: TAKE 1 TABLET BY MOUTH 2 TIMES DAILY AS NEEDED FOR PAIN     Last Filled:  6.15.21    Last appt: 6/15/2021   Next appt: Visit date not found    Last OARRS: No flowsheet data found.

## 2022-02-03 ENCOUNTER — PATIENT MESSAGE (OUTPATIENT)
Dept: ENDOCRINOLOGY | Age: 27
End: 2022-02-03

## 2022-02-03 NOTE — TELEPHONE ENCOUNTER
From: Gaviota Lala  To: Dr. Lencho Escudero: 2/3/2022 8:54 AM EST  Subject: Cannot make todays appointment    Hello Dr. Santos Frankel,    I have tried calling the office yesterday and this morning to reschedule the appointment I currently have scheduled for 11:10 today. I just wanted to make you aware that I will not be able to make it to the virtual visit. I will try calling the office again tomorrow to reschedule my appointment.     Thank you,  Shahid Sy

## 2022-03-01 NOTE — TELEPHONE ENCOUNTER
Medication:   Requested Prescriptions     Pending Prescriptions Disp Refills    JUNEL FE 1/20 1-20 MG-MCG per tablet [Pharmacy Med Name: Stevie Edwards 1 MG-20 MCG TABLET] 84 tablet 3     Sig: TAKE 1 TABLET BY MOUTH EVERY DAY     Last Filled:  3.23.21    Last appt: 6/15/2021   Next appt: Visit date not found    Last OARRS: No flowsheet data found.

## 2022-03-03 RX ORDER — NORETHINDRONE ACETATE AND ETHINYL ESTRADIOL AND FERROUS FUMARATE 1MG-20(21)
KIT ORAL
Qty: 84 TABLET | Refills: 3 | Status: SHIPPED | OUTPATIENT
Start: 2022-03-03

## 2022-03-24 DIAGNOSIS — E03.9 ACQUIRED HYPOTHYROIDISM: ICD-10-CM

## 2022-03-24 RX ORDER — LEVOTHYROXINE SODIUM 137 UG/1
TABLET ORAL
Qty: 30 TABLET | Refills: 0 | Status: SHIPPED | OUTPATIENT
Start: 2022-03-24 | End: 2022-04-11

## 2022-04-07 DIAGNOSIS — D64.9 ANEMIA, UNSPECIFIED TYPE: ICD-10-CM

## 2022-04-07 DIAGNOSIS — E04.2 MULTINODULAR GOITER: ICD-10-CM

## 2022-04-07 DIAGNOSIS — E53.8 B12 DEFICIENCY: ICD-10-CM

## 2022-04-07 DIAGNOSIS — E06.3 HASHIMOTO'S THYROIDITIS: ICD-10-CM

## 2022-04-07 DIAGNOSIS — N91.5 OLIGOMENORRHEA, UNSPECIFIED TYPE: ICD-10-CM

## 2022-04-07 DIAGNOSIS — E03.9 ACQUIRED HYPOTHYROIDISM: ICD-10-CM

## 2022-04-07 DIAGNOSIS — E55.9 VITAMIN D DEFICIENCY: ICD-10-CM

## 2022-04-07 LAB
A/G RATIO: 1.6 (ref 1.1–2.2)
ALBUMIN SERPL-MCNC: 5 G/DL (ref 3.4–5)
ALP BLD-CCNC: 46 U/L (ref 40–129)
ALT SERPL-CCNC: 23 U/L (ref 10–40)
ANION GAP SERPL CALCULATED.3IONS-SCNC: 12 MMOL/L (ref 3–16)
AST SERPL-CCNC: 30 U/L (ref 15–37)
BILIRUB SERPL-MCNC: 0.3 MG/DL (ref 0–1)
BUN BLDV-MCNC: 12 MG/DL (ref 7–20)
CALCIUM SERPL-MCNC: 9.8 MG/DL (ref 8.3–10.6)
CHLORIDE BLD-SCNC: 101 MMOL/L (ref 99–110)
CO2: 26 MMOL/L (ref 21–32)
CREAT SERPL-MCNC: 0.8 MG/DL (ref 0.6–1.1)
FOLATE: 8.04 NG/ML (ref 4.78–24.2)
GFR AFRICAN AMERICAN: >60
GFR NON-AFRICAN AMERICAN: >60
GLUCOSE BLD-MCNC: 87 MG/DL (ref 70–99)
POTASSIUM SERPL-SCNC: 4.2 MMOL/L (ref 3.5–5.1)
SODIUM BLD-SCNC: 139 MMOL/L (ref 136–145)
T3 FREE: 2.7 PG/ML (ref 2.3–4.2)
T4 FREE: 1.5 NG/DL (ref 0.9–1.8)
TOTAL PROTEIN: 8.2 G/DL (ref 6.4–8.2)
TSH SERPL DL<=0.05 MIU/L-ACNC: 16.16 UIU/ML (ref 0.27–4.2)
VITAMIN B-12: 252 PG/ML (ref 211–911)
VITAMIN D 25-HYDROXY: 27.7 NG/ML

## 2022-04-10 NOTE — PROGRESS NOTES
SUBJECTIVE:  Delmon Kussmaul is a 27 y.o. female who is being evaluated for hypothyroidism. 1. Acquired hypothyroidism    This started in 2019. Patient was diagnosed with hypothyroidism. The problem has been gradually worsening. Previous thyroid studies include: TSH and free thyroxine. Patient started medication in 2019. Currently patient is on: levothyroxine. Misses  0doses a month. Current complaints: fatigue, irritability, mood swings  Has fatigue, moderate in intensity  Fatigue worse in the afternoon  Has sleepiness  Uncontrolled  Stopped taking thyroid medication, resumed in 2/23/2022. No reason. 2. Hashimoto's thyroiditis  Complains of fatigue, weight gain  Positive TPO antibodies    3. Oligomenorrhea, unspecified type  Irregular periods without BCP and with BCP, usually skips 1 month  Started periods at 13 yo. Periods used to be heavy, not as heavy at this time. 4. Obesity  Gained weight 20 pounds over 2 years despite diet and exercise  Has , but did not lost weight  Celiac panel negative  On [facility] weight management program, lost some weight  Eats healthy, limits carbs  Had 1 course on phentermine, lost 18 lbs    5. Multinodular goiter  History of obstructive symptoms: difficulty swallowing No, changes in voice/hoarseness No.  History of radiation to patient's neck: No  Resent iodine exposure: No  Family history includes hypothyroidism  Family history of thyroid cancer: No    6. Low libido  Patient complains of low libido and is concerned that its abnormal at her age    9. Anemia, unspecified type  Has fatigue    8. B12 deficiency  No numbness    9.   Vitamin D deficiency   Has fatigue    1/8/2021  THYROID ULTRASOUND   History : Goiter       COMMENTS :    Thyroid size : Right lobe 4.0 x 1.2 x 1.4 cm                                     Left lobe 3.6 x 1.2 x 1.4 cm   Echotexture : Heterogeneous   Nodules :    16 x 12 x 9 mm heterogeneously hypoechoic nodule in the right inferior lobe - ACR TR 3   7 x 4 x 6 mm hyperechoic nodule in the left inferior lobe - ACR TR 3                   Impression   IMPRESSION :       Six-month follow-up ultrasound to document stability of hypoechoic nodule in the right lobe.       Heterogeneous echotexture noted. This can be seen with thyroiditis. Past Medical History:   Diagnosis Date    Goiter     Hashimoto's thyroiditis     Hypothyroidism      Patient Active Problem List    Diagnosis Date Noted    Overweight (BMI 25.0-29.9) 09/16/2021    Multinodular goiter 07/28/2021    Vitamin D deficiency 01/07/2021    Hashimoto's thyroiditis 12/29/2020    Acquired hypothyroidism 12/29/2020    Anemia 12/29/2020    B12 deficiency 12/29/2020    Class 1 obesity with body mass index (BMI) of 30.0 to 30.9 in adult 12/29/2020    Oligomenorrhea 12/29/2020    Low libido 12/29/2020     History reviewed. No pertinent surgical history. Family History   Problem Relation Age of Onset    Hypothyroidism Mother     Bipolar Disorder Mother      Social History     Socioeconomic History    Marital status: Single     Spouse name: None    Number of children: None    Years of education: None    Highest education level: None   Occupational History    None   Tobacco Use    Smoking status: Never Smoker    Smokeless tobacco: Never Used   Vaping Use    Vaping Use: Never used   Substance and Sexual Activity    Alcohol use: Yes     Comment: 2-3 per week    Drug use: Never    Sexual activity: None   Other Topics Concern    None   Social History Narrative    None     Social Determinants of Health     Financial Resource Strain:     Difficulty of Paying Living Expenses: Not on file   Food Insecurity:     Worried About Running Out of Food in the Last Year: Not on file    Edgard of Food in the Last Year: Not on file   Transportation Needs:     Lack of Transportation (Medical): Not on file    Lack of Transportation (Non-Medical):  Not on file   Physical Activity:  Days of Exercise per Week: Not on file    Minutes of Exercise per Session: Not on file   Stress:     Feeling of Stress : Not on file   Social Connections:     Frequency of Communication with Friends and Family: Not on file    Frequency of Social Gatherings with Friends and Family: Not on file    Attends Zoroastrian Services: Not on file    Active Member of [address] [place] [address] or Organizations: Not on file    Attends Club or Organization Meetings: Not on file    Marital Status: Not on file   Intimate Partner Violence:     Fear of Current or Ex-Partner: Not on file    Emotionally Abused: Not on file    Physically Abused: Not on file    Sexually Abused: Not on file   Housing Stability:     Unable to Pay for Housing in the Last Year: Not on file    Number of [place] in the Last Year: Not on file    Unstable Housing in the Last Year: Not on file     Current Outpatient Medications   Medication Sig Dispense Refill    levothyroxine (SYNTHROID) 137 MCG tablet TAKE 1 TABLET BY MOUTH EVERY DAY 30 tablet 2    ibuprofen (ADVIL;MOTRIN) 800 MG tablet TAKE 1 TABLET BY MOUTH 2 TIMES DAILY AS NEEDED FOR PAIN 180 tablet 1    ferrous sulfate (IRON 325) 325 (65 Fe) MG tablet Take 1 tablet by mouth daily (with breakfast) 90 tablet 0    cyanocobalamin ([employer] VITAMIN B12) 1000 MCG tablet Take 1 tablet by mouth daily 30 tablet 1    vitamin D3 (CHOLECALCIFEROL) 25 MCG (1000 UT) TABS tablet Take 2 tablets by mouth daily 90 tablet 1    JUNEL FE 1/20 1-20 MG-MCG per tablet TAKE 1 TABLET BY MOUTH EVERY DAY (Patient not taking: Reported on 4/11/2022) 84 tablet 3    Collagen Hydrolysate POWD by Does not apply route (Patient not taking: Reported on 10/21/2021)       No current facility-administered medications for this visit.      No Known Allergies  Family Status   Relation Name Status    Mother  (Not Specified)       Review of Systems:  Constitutional: has fatigue, no fever, has recent weight gain, no recent weight loss, no changes in appetite  Eyes: no eye pain, no change in vision, no eye redness, no eye irritation, no double vision  Ears, nose, throat: no nasal congestion, no sore throat, no earache, no decrease in hearing, no hoarseness, no dry mouth, no sinus problems, no difficulty swallowing, no neck lumps, no dental problems, no mouth sores, no ringing in ears  Pulmonary: no shortness of breath, no wheezing, no dyspnea on exertion, no cough  Cardiovascular: no chest pain, no lower extremity edema, no orthopnea, no intermittent leg claudication, no palpitations  Gastrointestinal: no abdominal pain, no nausea, no vomiting, no diarrhea, no constipation, no dysphagia, no heartburn, no bloating  Genitourinary: no dysuria, no urinary incontinence, no urinary hesitancy, no urinary frequency, no feelings of urinary urgency, no nocturia  Musculoskeletal: no joint swelling, no joint stiffness, no joint pain, no muscle cramps, no muscle pain, no bone pain  Integument/Breast: has hair loss, no skin rashes, no skin lesions, no itching, no dry skin  Neurological: no numbness, no tingling, no weakness, no confusion, has headaches, no dizziness, no fainting, no tremors, no decrease in memory, no balance problems  Psychiatric: has anxiety, has depression, no insomnia  Hematologic/Lymphatic: no tendency for easy bleeding, no swollen lymph nodes, no tendency for easy bruising  Immunology: no seasonal allergies, no frequent infections, no frequent illnesses  Endocrine: has temperature intolerance    /76   Pulse 65   Temp 98 °F (36.7 °C)   Resp 14   Ht 5' 3\" (1.6 m)   Wt 151 lb (68.5 kg)   SpO2 99%   BMI 26.75 kg/m²    Wt Readings from Last 3 Encounters:   04/11/22 151 lb (68.5 kg)   10/21/21 155 lb 6.4 oz (70.5 kg)   09/16/21 165 lb 9.6 oz (75.1 kg)     Body mass index is 26.75 kg/m².     OBJECTIVE:  Constitutional: no acute distress, well appearing and well nourished  Psychiatric: oriented to person, place and time, judgement and insight and normal, recent and remote memory and intact and mood and affect are normal  Skin: skin and subcutaneous tissue is normal without mass, normal turgor  Head and Face: examination of head and face revealed no abnormalities  Eyes: no lid or conjunctival swelling, erythema or discharge, pupils are normal, equal, round, reactive to light  Ears/Nose: external inspection of ears and nose revealed no abnormalities, hearing is grossly normal  Oropharynx/Mouth/Face: lips, tongue and gums are normal with no lesions, the voice quality was normal  Neck: neck is supple and symmetric, with midline trachea and no masses, thyroid is pebbly  Lymphatics: normal cervical lymph nodes, normal supraclavicular nodes  Pulmonary: no increased work of breathing or signs of respiratory distress, lungs are clear to auscultation  Cardiovascular: normal heart rate and rhythm, normal S1 and S2, no murmurs and pedal pulses and 2+ bilaterally, No edema  Abdomen: abdomen is soft, non-tender with no masses  Musculoskeletal: normal gait and station and exam of the digits and nails are normal  Neurological: normal coordination and normal general cortical function      Lab Review:    Lab Results   Component Value Date    WBC 4.7 12/29/2020    HGB 12.9 12/29/2020    HCT 39.5 12/29/2020    MCV 82.9 12/29/2020     12/29/2020     Lab Results   Component Value Date     04/07/2022    K 4.2 04/07/2022     04/07/2022    CO2 26 04/07/2022    BUN 12 04/07/2022    CREATININE 0.8 04/07/2022    GLUCOSE 87 04/07/2022    CALCIUM 9.8 04/07/2022    PROT 8.2 04/07/2022    LABALBU 5.0 04/07/2022    BILITOT 0.3 04/07/2022    ALKPHOS 46 04/07/2022    AST 30 04/07/2022    ALT 23 04/07/2022    LABGLOM >60 04/07/2022    GFRAA >60 04/07/2022    AGRATIO 1.6 04/07/2022    GLOB 3.0 10/19/2021     Lab Results   Component Value Date    TSH 16.16 04/07/2022    FT3 2.7 04/07/2022     No results found for: LABA1C  No results found for: EAG  No results found for: CHOL  No results found for: TRIG  No results found for: HDL  No results found for: LDLCHOLESTEROL, LDLCALC  No results found for: LABVLDL, VLDL  No results found for: CHOLHDLRATIO  No results found for: Hollie Espitia  Lab Results   Component Value Date    VITD25 27.7 04/07/2022        ASSESSMENT/PLAN:    1. Acquired hypothyroidism  Uncontrolled  Stopped taking thyroid medication, resumed in 2/23/2022. No reason. Not on BCP  TSH 34.14-3.62-10.48-2.9 -0.35-16.16  Continue levothyroxine 0.137 mg daily  Counseled patient about different thyroid medications, their side effects and benefits.  - T3, Free; Future  - T4, Free; Future  - TSH without Reflex; Future    2. Hashimoto's thyroiditis  - Thyroid Peroxidase Antibody; Future  - Anti-Thyroglobulin Antibody; Future    3. Anemia, unspecified type  Ferritin 9.8-35.7  - Vitamin B12 & Folate; Future  - CBC; Future  - Iron and TIBC; Future  - Ferritin; Future  - Vitamin D 25 Hydroxy; Future    4. B12 deficiency  B12 [phone]  Will see PCP  - Vitamin B12 & Folate; Future    5. Multinodular goiter  Call for results, ordered to repeat thyroid sono  TSH 0.35  FNA of the right 1.6 cm nodule was ordered, but patient did not do it  1/2021 thyroid ultrasound right 1.6 cm, left 0.7 cm nodules  - US HEAD NECK SOFT TISSUE THYROID; Future    6. Obesity  - Comprehensive Metabolic Panel; Future  Diet and exercise    7. Oligomenorrhea, unspecified type  Follow    8. Low libido  - DHEA-Sulfate; Future  - Testosterone, free, total; Future    9.  Vitamin D deficiency  Uncontrolled  25OHvitamin D 24.4-40.2-33.0-27.7  Has fatigue  Vitamin D 2000 international units daily, increase to 3000 IU daily      Reviewed and/or ordered clinical lab results Yes  Reviewed and/or ordered radiology tests Yes   Reviewed and/or ordered other diagnostic tests No  Discussed test results with performing physician No  Independently reviewed image, tracing, or specimen No  Made a decision to obtain old records No  Reviewed and summarized old records No   TSH 26.7-8.2-2.8  Hemoglobin A1c 5.4  B12 96  TPO antibody 734.9  Thyroglobulin antibody 1.9  Celiac panel negative  Hb 11.2  Obtained history from other than patient No    Nils Dawn was counseled regarding symptoms of thyroid, oligomenorrhea, Hashimoto's thyroiditis, low libido diagnosis, course and complications of disease if inadequately treated, side effects of medications, diagnosis, treatment options, and prognosis, risks, benefits, complications, and alternatives of treatment, labs, imaging and other studies and treatment targets and goals, different thyroid medications, side effects and benefits. She understands instructions and counseling. Return in about 3 months (around 7/11/2022) for thyroid problems.     Electronically signed by Charli Jha MD on 4/11/2022 at 7:31 AM

## 2022-04-11 ENCOUNTER — OFFICE VISIT (OUTPATIENT)
Dept: ENDOCRINOLOGY | Age: 27
End: 2022-04-11
Payer: COMMERCIAL

## 2022-04-11 VITALS
OXYGEN SATURATION: 99 % | HEART RATE: 65 BPM | DIASTOLIC BLOOD PRESSURE: 76 MMHG | RESPIRATION RATE: 14 BRPM | BODY MASS INDEX: 26.75 KG/M2 | SYSTOLIC BLOOD PRESSURE: 118 MMHG | HEIGHT: 63 IN | TEMPERATURE: 98 F | WEIGHT: 151 LBS

## 2022-04-11 DIAGNOSIS — E55.9 VITAMIN D DEFICIENCY: ICD-10-CM

## 2022-04-11 DIAGNOSIS — E04.2 MULTINODULAR GOITER: ICD-10-CM

## 2022-04-11 DIAGNOSIS — R68.82 LOW LIBIDO: ICD-10-CM

## 2022-04-11 DIAGNOSIS — E66.3 OVERWEIGHT (BMI 25.0-29.9): ICD-10-CM

## 2022-04-11 DIAGNOSIS — E03.9 ACQUIRED HYPOTHYROIDISM: Primary | ICD-10-CM

## 2022-04-11 DIAGNOSIS — N91.5 OLIGOMENORRHEA, UNSPECIFIED TYPE: ICD-10-CM

## 2022-04-11 DIAGNOSIS — D64.9 ANEMIA, UNSPECIFIED TYPE: ICD-10-CM

## 2022-04-11 DIAGNOSIS — E06.3 HASHIMOTO'S THYROIDITIS: ICD-10-CM

## 2022-04-11 DIAGNOSIS — E53.8 B12 DEFICIENCY: ICD-10-CM

## 2022-04-11 PROCEDURE — G8417 CALC BMI ABV UP PARAM F/U: HCPCS | Performed by: INTERNAL MEDICINE

## 2022-04-11 PROCEDURE — 1036F TOBACCO NON-USER: CPT | Performed by: INTERNAL MEDICINE

## 2022-04-11 PROCEDURE — 99214 OFFICE O/P EST MOD 30 MIN: CPT | Performed by: INTERNAL MEDICINE

## 2022-04-11 PROCEDURE — G8427 DOCREV CUR MEDS BY ELIG CLIN: HCPCS | Performed by: INTERNAL MEDICINE

## 2022-04-11 RX ORDER — LEVOTHYROXINE SODIUM 137 UG/1
TABLET ORAL
Qty: 30 TABLET | Refills: 2 | Status: SHIPPED | OUTPATIENT
Start: 2022-04-11 | End: 2022-05-08

## 2022-05-02 ENCOUNTER — PATIENT MESSAGE (OUTPATIENT)
Dept: ENDOCRINOLOGY | Age: 27
End: 2022-05-02

## 2022-05-02 DIAGNOSIS — E03.9 ACQUIRED HYPOTHYROIDISM: Primary | ICD-10-CM

## 2022-05-02 NOTE — TELEPHONE ENCOUNTER
Spoke with patient. I advised to get blood work done as soon as possible. Ordered labs. Not fasting test.  Increase levothyroxine to 0.137 mg 1-1/2 tablet for 2 days before lab results are available.

## 2022-05-02 NOTE — TELEPHONE ENCOUNTER
From: Sammi Hanks  To: Dr. Raffi Dailey: 5/2/2022 2:31 PM EDT  Subject: Im pregnant/short term disability     Hi Dr. Zuleyka Mills,    I just found out I am pregnant. You mentioned that I should reach out to you to increase my medication dosage. With this recent news I have applied for short term disability to remove myself from the significant stress from work to try and regulate my thyroid during this critical time. I gave the company your information and they mentioned they would potentially be reaching out. Please let me know if I need to make an appointment to see you or to get blood work done before increasing my medicine.      Thank you,  Bakari Spring

## 2022-05-03 DIAGNOSIS — E03.9 ACQUIRED HYPOTHYROIDISM: ICD-10-CM

## 2022-05-03 LAB
T3 FREE: 3 PG/ML (ref 2.3–4.2)
T4 FREE: 2 NG/DL (ref 0.9–1.8)
TSH SERPL DL<=0.05 MIU/L-ACNC: 2.62 UIU/ML (ref 0.27–4.2)

## 2022-05-08 ENCOUNTER — TELEPHONE (OUTPATIENT)
Dept: ENDOCRINOLOGY | Age: 27
End: 2022-05-08

## 2022-05-08 DIAGNOSIS — E03.9 ACQUIRED HYPOTHYROIDISM: Primary | ICD-10-CM

## 2022-05-08 RX ORDER — LEVOTHYROXINE SODIUM 0.15 MG/1
TABLET ORAL
Qty: 30 TABLET | Refills: 2 | Status: SHIPPED | OUTPATIENT
Start: 2022-05-08 | End: 2022-07-18 | Stop reason: DRUGHIGH

## 2022-05-08 NOTE — TELEPHONE ENCOUNTER
Call patient with results. Pregnant. TSH 2.62. Will advise to increase levothyroxine to 0.15 mg daily.

## 2022-05-08 NOTE — TELEPHONE ENCOUNTER
Spoke with patient. Informed her that I reviewed results. TSH 2.62. Increase levothyroxine to 0.15 mg daily. Labs in 1 month. Call for results in 1 month. Then will be labs before appointment in July. Marcela Edwards

## 2022-05-12 ENCOUNTER — TELEPHONE (OUTPATIENT)
Dept: ENDOCRINOLOGY | Age: 27
End: 2022-05-12

## 2022-05-13 NOTE — TELEPHONE ENCOUNTER
Please call patient to inform that we received a form for disability. They are asking about indications for disability, functional limitations preventing her from working in any capacity. I did not diagnose any functional limitations during the office visit. Do you have any functional limitations at this time? What are clinical symptoms what you experience that bad that you are asking for disability? Also, they are giving option about modified duty. Would you be able to perform any work at your work with special accommodations? In other words, what do you consider you are able to perform at work?

## 2022-07-08 ENCOUNTER — HOSPITAL ENCOUNTER (OUTPATIENT)
Dept: ULTRASOUND IMAGING | Age: 27
Discharge: HOME | End: 2022-07-08
Payer: COMMERCIAL

## 2022-07-08 DIAGNOSIS — E04.2 MULTINODULAR GOITER: ICD-10-CM

## 2022-07-08 PROCEDURE — 76536 US EXAM OF HEAD AND NECK: CPT

## 2022-07-15 DIAGNOSIS — D64.9 ANEMIA, UNSPECIFIED TYPE: ICD-10-CM

## 2022-07-15 DIAGNOSIS — E53.8 B12 DEFICIENCY: ICD-10-CM

## 2022-07-15 DIAGNOSIS — E55.9 VITAMIN D DEFICIENCY: ICD-10-CM

## 2022-07-15 DIAGNOSIS — E06.3 HASHIMOTO'S THYROIDITIS: ICD-10-CM

## 2022-07-15 DIAGNOSIS — E03.9 ACQUIRED HYPOTHYROIDISM: ICD-10-CM

## 2022-07-15 DIAGNOSIS — E04.2 MULTINODULAR GOITER: ICD-10-CM

## 2022-07-15 DIAGNOSIS — N91.5 OLIGOMENORRHEA, UNSPECIFIED TYPE: ICD-10-CM

## 2022-07-15 LAB
A/G RATIO: 1.5 (ref 1.1–2.2)
ALBUMIN SERPL-MCNC: 4.6 G/DL (ref 3.4–5)
ALP BLD-CCNC: 37 U/L (ref 40–129)
ALT SERPL-CCNC: 14 U/L (ref 10–40)
ANION GAP SERPL CALCULATED.3IONS-SCNC: 9 MMOL/L (ref 3–16)
AST SERPL-CCNC: 23 U/L (ref 15–37)
BILIRUB SERPL-MCNC: 0.3 MG/DL (ref 0–1)
BUN BLDV-MCNC: 10 MG/DL (ref 7–20)
CALCIUM SERPL-MCNC: 9.7 MG/DL (ref 8.3–10.6)
CHLORIDE BLD-SCNC: 105 MMOL/L (ref 99–110)
CO2: 27 MMOL/L (ref 21–32)
CREAT SERPL-MCNC: 0.7 MG/DL (ref 0.6–1.1)
GFR AFRICAN AMERICAN: >60
GFR NON-AFRICAN AMERICAN: >60
GLUCOSE BLD-MCNC: 87 MG/DL (ref 70–99)
POTASSIUM SERPL-SCNC: 4.5 MMOL/L (ref 3.5–5.1)
SODIUM BLD-SCNC: 141 MMOL/L (ref 136–145)
T3 FREE: 3.1 PG/ML (ref 2.3–4.2)
T4 FREE: 1.9 NG/DL (ref 0.9–1.8)
TOTAL PROTEIN: 7.6 G/DL (ref 6.4–8.2)
TSH SERPL DL<=0.05 MIU/L-ACNC: 0.64 UIU/ML (ref 0.27–4.2)
VITAMIN D 25-HYDROXY: 35 NG/ML

## 2022-07-17 NOTE — PROGRESS NOTES
SUBJECTIVE:  Brad Freeman is a 27 y.o. female who is being evaluated for hypothyroidism. 1. Acquired hypothyroidism    This started in 2019. Patient was diagnosed with hypothyroidism. The problem has been gradually worsening. Previous thyroid studies include: TSH and free thyroxine. Patient started medication in 2019. Currently patient is on: levothyroxine. Misses  0doses a month. Current complaints: fatigue, irritability, mood swings  Has fatigue, moderate in intensity  Fatigue worse in the afternoon  Has sleepiness  Uncontrolled  Stopped taking thyroid medication, resumed in 2/23/2022. No reason. 2. Hashimoto's thyroiditis  Complains of fatigue, weight gain  Positive TPO antibodies    3. Oligomenorrhea, unspecified type  Irregular periods without BCP and with BCP, usually skips 1 month  Started periods at 13 yo. Periods used to be heavy, not as heavy at this time. 4. Obesity  Gained weight 20 pounds over 2 years despite diet and exercise  Has , but did not lost weight  Celiac panel negative  On [facility] weight management program, lost some weight  Eats healthy, limits carbs  Had 1 course on phentermine, lost 18 lbs  Joined different work out program    5. Multinodular goiter  History of obstructive symptoms: difficulty swallowing No, changes in voice/hoarseness No.  History of radiation to patient's neck: No  Resent iodine exposure: No  Family history includes hypothyroidism  Family history of thyroid cancer: No    6. Low libido  Patient complains of low libido and is concerned that its abnormal at her age    9. Anemia, unspecified type  Has fatigue    8. B12 deficiency  No numbness    9.   Vitamin D deficiency   Has fatigue    1/8/2021  THYROID ULTRASOUND   History : Goiter       COMMENTS :    Thyroid size : Right lobe 4.0 x 1.2 x 1.4 cm                                     Left lobe 3.6 x 1.2 x 1.4 cm   Echotexture : Heterogeneous   Nodules :    16 x 12 x 9 mm heterogeneously mouth daily 30 tablet 1    vitamin D3 (CHOLECALCIFEROL) 25 MCG (1000 UT) TABS tablet Take 2 tablets by mouth daily 90 tablet 1    JUNEL FE 1/20 1-20 MG-MCG per tablet TAKE 1 TABLET BY MOUTH EVERY DAY (Patient not taking: No sig reported) 84 tablet 3    Collagen Hydrolysate POWD by Does not apply route (Patient not taking: No sig reported)       No current facility-administered medications for this visit.      No Known Allergies  Family Status   Relation Name Status    Mother  (Not Specified)       Review of Systems:  Constitutional: has fatigue, no fever, has recent weight gain, no recent weight loss, no changes in appetite  Eyes: no eye pain, no change in vision, no eye redness, no eye irritation, no double vision  Ears, nose, throat: no nasal congestion, no sore throat, no earache, no decrease in hearing, no hoarseness, no dry mouth, no sinus problems, no difficulty swallowing, no neck lumps, no dental problems, no mouth sores, no ringing in ears  Pulmonary: no shortness of breath, no wheezing, no dyspnea on exertion, no cough  Cardiovascular: no chest pain, no lower extremity edema, no orthopnea, no intermittent leg claudication, no palpitations  Gastrointestinal: no abdominal pain, no nausea, no vomiting, no diarrhea, no constipation, no dysphagia, no heartburn, no bloating  Genitourinary: no dysuria, no urinary incontinence, no urinary hesitancy, no urinary frequency, no feelings of urinary urgency, no nocturia  Musculoskeletal: no joint swelling, no joint stiffness, no joint pain, no muscle cramps, no muscle pain, no bone pain  Integument/Breast: has hair loss, no skin rashes, no skin lesions, no itching, no dry skin  Neurological: no numbness, no tingling, no weakness, no confusion, has headaches, no dizziness, no fainting, no tremors, no decrease in memory, no balance problems  Psychiatric: has anxiety, has depression, no insomnia  Hematologic/Lymphatic: no tendency for easy bleeding, no swollen lymph nodes, no tendency for easy bruising  Immunology: no seasonal allergies, no frequent infections, no frequent illnesses  Endocrine: has temperature intolerance    /76   Pulse 71   Temp 98 °F (36.7 °C)   Resp 14   Ht 5' 3\" (1.6 m)   Wt 183 lb (83 kg)   SpO2 98%   BMI 32.42 kg/m²    Wt Readings from Last 3 Encounters:   07/18/22 183 lb (83 kg)   04/11/22 151 lb (68.5 kg)   10/21/21 155 lb 6.4 oz (70.5 kg)     Body mass index is 32.42 kg/m².     OBJECTIVE:  Constitutional: no acute distress, well appearing and well nourished  Psychiatric: oriented to person, place and time, judgement and insight and normal, recent and remote memory and intact and mood and affect are normal  Skin: skin and subcutaneous tissue is normal without mass, normal turgor  Head and Face: examination of head and face revealed no abnormalities  Eyes: no lid or conjunctival swelling, erythema or discharge, pupils are normal, equal, round, reactive to light  Ears/Nose: external inspection of ears and nose revealed no abnormalities, hearing is grossly normal  Oropharynx/Mouth/Face: lips, tongue and gums are normal with no lesions, the voice quality was normal  Neck: neck is supple and symmetric, with midline trachea and no masses, thyroid is pebbly  Lymphatics: normal cervical lymph nodes, normal supraclavicular nodes  Pulmonary: no increased work of breathing or signs of respiratory distress, lungs are clear to auscultation  Cardiovascular: normal heart rate and rhythm, normal S1 and S2, no murmurs and pedal pulses and 2+ bilaterally, No edema  Abdomen: abdomen is soft, non-tender with no masses  Musculoskeletal: normal gait and station and exam of the digits and nails are normal  Neurological: normal coordination and normal general cortical function      Lab Review:    Lab Results   Component Value Date/Time    WBC 4.7 12/29/2020 08:50 AM    HGB 12.9 12/29/2020 08:50 AM    HCT 39.5 12/29/2020 08:50 AM    MCV 82.9 12/29/2020 08:50 AM     12/29/2020 08:50 AM     Lab Results   Component Value Date/Time     07/15/2022 11:07 AM    K 4.5 07/15/2022 11:07 AM     07/15/2022 11:07 AM    CO2 27 07/15/2022 11:07 AM    BUN 10 07/15/2022 11:07 AM    CREATININE 0.7 07/15/2022 11:07 AM    GLUCOSE 87 07/15/2022 11:07 AM    CALCIUM 9.7 07/15/2022 11:07 AM    PROT 7.6 07/15/2022 11:07 AM    LABALBU 4.6 07/15/2022 11:07 AM    BILITOT 0.3 07/15/2022 11:07 AM    ALKPHOS 37 07/15/2022 11:07 AM    AST 23 07/15/2022 11:07 AM    ALT 14 07/15/2022 11:07 AM    LABGLOM >60 07/15/2022 11:07 AM    GFRAA >60 07/15/2022 11:07 AM    AGRATIO 1.5 07/15/2022 11:07 AM    GLOB 3.0 10/19/2021 12:13 PM     Lab Results   Component Value Date/Time    TSH 0.64 07/15/2022 11:07 AM    FT3 3.1 07/15/2022 11:07 AM     No results found for: LABA1C  No results found for: EAG  No results found for: CHOL  No results found for: TRIG  No results found for: HDL  No results found for: LDLCHOLESTEROL, LDLCALC  No results found for: LABVLDL, VLDL  No results found for: CHOLHDLRATIO  No results found for: LABMICR, [id number]  Lab Results   Component Value Date/Time    VITD25 35.0 07/15/2022 11:07 AM        ASSESSMENT/PLAN:    1. Acquired hypothyroidism  Will try to get pregnant in 9/2022. Had miscarriage. Stopped taking thyroid medication, resumed in 2/23/2022. Not on BCP  TSH 34.14-3.62-10.48-2.9 -0.35-16.16-2.62-0.64  Continue levothyroxine 0.137 mg daily  Counseled patient about different thyroid medications, their side effects and benefits.  - T3, Free; Future  - T4, Free; Future  - TSH without Reflex; Future    2. Hashimoto's thyroiditis  - Thyroid Peroxidase Antibody; Future  - Anti-Thyroglobulin Antibody; Future    3. Anemia, unspecified type  Ferritin 9.8-35.7  - Vitamin B12 & Folate; Future  - CBC; Future  - Iron and TIBC; Future  - Ferritin; Future  - Vitamin D 25 Hydroxy; Future    4. B12 deficiency  B12 [phone]  Will see PCP  - Vitamin B12 & Folate; Future    5.  Multinodular goiter  Continue monitoring, spongiform 1.4 cm nodule  TSH 0.35  FNA of the right 1.6 cm nodule was ordered, but patient did not do it  1/2021 thyroid ultrasound right 1.6 cm, left 0.7 cm nodules  7/2022 thyroid ultrasound right 1.4 cm, spongiform, left 0.7 mm nodule  - US HEAD NECK SOFT TISSUE THYROID; Future    6. Obesity  - Comprehensive Metabolic Panel; Future  Diet and exercise    7. Oligomenorrhea, unspecified type  Follow    8. Low libido  - DHEA-Sulfate; Future  - Testosterone, free, total; Future    9. Vitamin D deficiency  25OHvitamin D 24.4-40.2-33.0-27.7-35  Has fatigue  Vitamin D 2000 international units daily      Reviewed and/or ordered clinical lab results Yes  Reviewed and/or ordered radiology tests Yes   Reviewed and/or ordered other diagnostic tests No  Discussed test results with performing physician No  Independently reviewed image, tracing, or specimen No  Made a decision to obtain old records No  Reviewed and summarized old records No   TSH 26.7-8.2-2.8  Hemoglobin A1c 5.4  B12 96  TPO antibody 734.9  Thyroglobulin antibody 1.9  Celiac panel negative  Hb 11.2  Obtained history from other than patient No    Dipika Walton was counseled regarding symptoms of thyroid, oligomenorrhea, Hashimoto's thyroiditis, low libido diagnosis, course and complications of disease if inadequately treated, side effects of medications, diagnosis, treatment options, and prognosis, risks, benefits, complications, and alternatives of treatment, labs, imaging and other studies and treatment targets and goals, different thyroid medications, side effects and benefits. She understands instructions and counseling. Return in about 4 months (around 11/18/2022) for thyroid problems.     Electronically signed by Miguel Parrish MD on 7/18/2022 at 8:31 AM

## 2022-07-18 ENCOUNTER — OFFICE VISIT (OUTPATIENT)
Dept: ENDOCRINOLOGY | Age: 27
End: 2022-07-18
Payer: COMMERCIAL

## 2022-07-18 VITALS
HEART RATE: 71 BPM | HEIGHT: 63 IN | WEIGHT: 183 LBS | BODY MASS INDEX: 32.43 KG/M2 | TEMPERATURE: 98 F | RESPIRATION RATE: 14 BRPM | OXYGEN SATURATION: 98 % | DIASTOLIC BLOOD PRESSURE: 76 MMHG | SYSTOLIC BLOOD PRESSURE: 116 MMHG

## 2022-07-18 DIAGNOSIS — E03.9 ACQUIRED HYPOTHYROIDISM: Primary | ICD-10-CM

## 2022-07-18 DIAGNOSIS — E53.8 B12 DEFICIENCY: ICD-10-CM

## 2022-07-18 DIAGNOSIS — E06.3 HASHIMOTO'S THYROIDITIS: ICD-10-CM

## 2022-07-18 DIAGNOSIS — E55.9 VITAMIN D DEFICIENCY: ICD-10-CM

## 2022-07-18 DIAGNOSIS — E04.2 MULTINODULAR GOITER: ICD-10-CM

## 2022-07-18 DIAGNOSIS — N91.5 OLIGOMENORRHEA, UNSPECIFIED TYPE: ICD-10-CM

## 2022-07-18 DIAGNOSIS — R68.82 LOW LIBIDO: ICD-10-CM

## 2022-07-18 DIAGNOSIS — D64.9 ANEMIA, UNSPECIFIED TYPE: ICD-10-CM

## 2022-07-18 DIAGNOSIS — E66.9 CLASS 1 OBESITY WITH BODY MASS INDEX (BMI) OF 32.0 TO 32.9 IN ADULT, UNSPECIFIED OBESITY TYPE, UNSPECIFIED WHETHER SERIOUS COMORBIDITY PRESENT: ICD-10-CM

## 2022-07-18 PROCEDURE — 99214 OFFICE O/P EST MOD 30 MIN: CPT | Performed by: INTERNAL MEDICINE

## 2022-07-18 RX ORDER — LEVOTHYROXINE SODIUM 137 UG/1
TABLET ORAL
Qty: 90 TABLET | Refills: 1 | Status: SHIPPED | OUTPATIENT
Start: 2022-07-18

## 2022-07-18 RX ORDER — LEVOTHYROXINE SODIUM 137 UG/1
TABLET ORAL
COMMUNITY
Start: 2022-06-01 | End: 2022-07-18 | Stop reason: SDUPTHER